# Patient Record
Sex: MALE | Race: WHITE | NOT HISPANIC OR LATINO | Employment: OTHER
[De-identification: names, ages, dates, MRNs, and addresses within clinical notes are randomized per-mention and may not be internally consistent; named-entity substitution may affect disease eponyms.]

---

## 2019-05-06 ENCOUNTER — RECORDS - HEALTHEAST (OUTPATIENT)
Dept: ADMINISTRATIVE | Facility: OTHER | Age: 64
End: 2019-05-06

## 2019-05-09 ENCOUNTER — AMBULATORY - HEALTHEAST (OUTPATIENT)
Dept: NEUROLOGY | Facility: CLINIC | Age: 64
End: 2019-05-09

## 2019-05-09 DIAGNOSIS — R41.3 MEMORY DIFFICULTIES: ICD-10-CM

## 2019-05-31 ENCOUNTER — COMMUNICATION - HEALTHEAST (OUTPATIENT)
Dept: BEHAVIORAL HEALTH | Facility: CLINIC | Age: 64
End: 2019-05-31

## 2019-06-06 ENCOUNTER — COMMUNICATION - HEALTHEAST (OUTPATIENT)
Dept: TELEHEALTH | Facility: CLINIC | Age: 64
End: 2019-06-06

## 2019-06-06 ENCOUNTER — HOSPITAL ENCOUNTER (OUTPATIENT)
Dept: MRI IMAGING | Facility: HOSPITAL | Age: 64
Discharge: HOME | End: 2019-06-06

## 2019-06-06 DIAGNOSIS — R41.3 MEMORY DIFFICULTIES: ICD-10-CM

## 2019-06-06 LAB
CREAT BLD-MCNC: 0.9 MG/DL (ref 0.7–1.3)
GFR SERPL CREATININE-BSD FRML MDRD: >60 ML/MIN/1.73M2

## 2021-06-16 PROBLEM — C43.62 MALIGNANT MELANOMA OF LEFT UPPER EXTREMITY INCLUDING SHOULDER (H): Status: ACTIVE | Noted: 2020-06-17

## 2021-06-16 PROBLEM — E66.811 CLASS 1 OBESITY WITH BODY MASS INDEX (BMI) OF 32.0 TO 32.9 IN ADULT: Status: ACTIVE | Noted: 2018-08-06

## 2021-06-16 PROBLEM — G47.33 OSA (OBSTRUCTIVE SLEEP APNEA): Status: ACTIVE | Noted: 2019-02-25

## 2021-06-16 PROBLEM — R55 SYNCOPE: Status: ACTIVE | Noted: 2019-03-05

## 2021-06-16 PROBLEM — Z85.850 HISTORY OF THYROID CANCER: Status: ACTIVE | Noted: 2018-08-06

## 2021-06-16 PROBLEM — E03.9 HYPOTHYROIDISM (ACQUIRED): Status: ACTIVE | Noted: 2018-02-12

## 2021-06-16 PROBLEM — I95.9 HYPOTENSION: Status: ACTIVE | Noted: 2020-11-25

## 2021-06-16 PROBLEM — U07.1 COVID-19: Status: ACTIVE | Noted: 2020-11-25

## 2021-06-16 PROBLEM — I10 BENIGN ESSENTIAL HTN: Status: ACTIVE | Noted: 2019-12-23

## 2021-06-19 NOTE — LETTER
Letter by Magdy Brian, Ph.D,LP at      Author: Magdy Brian, Ph.D,LP Service: -- Author Type: --    Filed:  Encounter Date: 5/31/2019 Status: (Other)         Mak Pollard  [address] [address]                 May 31, 2019       Dear Mr. Pollard,    Our office received a referral from Dr Cj Lowry @ [facility] to set up  neuropsychological testing for you. Staff have tried and left 3 voicemails at the number listed on the referral. If you are interested in scheduling this appointment, please call Claudia @ [phone] or leave a voice mail @ [phone]. This referral will be good for 1 year. Thanks!      Please call with questions or contact us using [employer].      Sincerely,        Electronically signed by Magdy Brian, Ph.D,LP

## 2021-06-27 ENCOUNTER — HEALTH MAINTENANCE LETTER (OUTPATIENT)
Age: 66
End: 2021-06-27

## 2021-10-17 ENCOUNTER — HEALTH MAINTENANCE LETTER (OUTPATIENT)
Age: 66
End: 2021-10-17

## 2021-11-17 ENCOUNTER — OFFICE VISIT (OUTPATIENT)
Dept: CARDIOLOGY | Facility: CLINIC | Age: 66
End: 2021-11-17
Payer: MEDICARE

## 2021-11-17 VITALS
BODY MASS INDEX: 35.7 KG/M2 | SYSTOLIC BLOOD PRESSURE: 124 MMHG | HEART RATE: 74 BPM | RESPIRATION RATE: 18 BRPM | WEIGHT: 255 LBS | DIASTOLIC BLOOD PRESSURE: 82 MMHG | HEIGHT: 71 IN

## 2021-11-17 DIAGNOSIS — R60.0 BILATERAL LOWER EXTREMITY EDEMA: Primary | ICD-10-CM

## 2021-11-17 PROCEDURE — 99204 OFFICE O/P NEW MOD 45 MIN: CPT | Performed by: INTERNAL MEDICINE

## 2021-11-17 RX ORDER — ALBUTEROL SULFATE 90 UG/1
1-2 AEROSOL, METERED RESPIRATORY (INHALATION) PRN
COMMUNITY
Start: 2021-03-25

## 2021-11-17 RX ORDER — BUDESONIDE AND FORMOTEROL FUMARATE DIHYDRATE 160; 4.5 UG/1; UG/1
1 AEROSOL RESPIRATORY (INHALATION) 2 TIMES DAILY
COMMUNITY
Start: 2021-03-25 | End: 2024-07-31

## 2021-11-17 RX ORDER — BUSPIRONE HYDROCHLORIDE 10 MG/1
10 TABLET ORAL PRN
COMMUNITY
Start: 2020-06-17 | End: 2023-07-11

## 2021-11-17 ASSESSMENT — MIFFLIN-ST. JEOR: SCORE: 1963.8

## 2021-11-17 NOTE — LETTER
11/17/2021    Joanne Winkler MD  [facility] [address] [address]    RE: Mak RACHEL Latrice       Dear Colleague,    I had the pleasure of seeing Mak Pollard in the [facility].           [facility]   [address], [place] [address]   [email or web address]   OFFICE: [phone]          Thank you Dr. Rodríguez for asking the [facility] team to participate in the care of your patient, Mak Pollard.     Impression and Plan     Lower extremity edema.  I suspect that this is primarily dependent in nature and also related to some central obesity limiting venous return.  As noted below, with the Covid pandemic he has gained approximately 20 pounds in weight and also has not been walking as much as he used to.  Suspect these are contributors.  He denies any other significant concerning symptoms.  His lower extremity edema is somewhat disparate and that his left ankle seems somewhat more swollen than the right.  He reports no injury or pain.  I discussed the suspected etiology with Mak and options for treatment.  Parenthetically, echocardiogram 31 March 2021 revealed no significant concerning findings.  Plan:     Bilateral lower extremity venous ultrasound to exclude evidence of venous thrombosis though not strongly suspected.    Patient instructed to adhere to low-sodium diet which he admits he has not been watching really at all.    Did discuss possibly starting diuretic to help with the swelling though he would rather work on trying to lose weight, be more active, and work hard on limiting his sodium.    Follow-up and further recommendations as needed pending lower extremity ultrasound.     30 minutes spent reviewing prior records (including documentation, laboratory studies, cardiac testing/imaging), interview with patient along with physical exam, planning, and  "subsequent documentation/crafting of note.       History of Present Illness    Once again I would like to thank you again for asking me to participate in the care of your patient, Mak Pollard.  As you know, but to reiterate for my own records, Mak Pollard is a 65 year old male who has been experiencing some bilateral lower extremity edema.  Patient reports over the past few months some gradual increasing lower extremity edema which has been mostly at the ankles with the left being slightly more than the right.  He denies any injury or pain involving the remedies.  He does admit that he has gained approximately 20 pounds over the summer.  He also has been less active in his activity/walking partly because of the pandemic.  He denies any chest pain.  His breathing is comfortable.  He reports no palpitations or lightheadedness.    Further review of systems is otherwise negative/noncontributory (medical record and 13 point review of systems reviewed as well and pertinent positives noted).       Cardiac Diagnostics       Echocardiogram 31 March 2021:   1. Technically difficult study.   2. Normal left ventricular size and systolic performance with ejection fraction of 65 to 70%.   3. Mild increase in left ventricular wall thickness.   4. No significant valvular heart disease.   5. Normal right ventricular size and systolic performance.   6. Mild to moderate left atrial enlargement.  Right term of normal dimension.      Holter monitor 5 March 2019:   1. Findings are consistent with completely benign 24-hour Holter monitor with no tachycardia or bradycardic arrhythmias or anything from an electrical aspect to explain patient's syncope.      Twelve-lead ECG (personally reviewed) 25 November 2020: Normal sinus rhythm.  Normal ECG.         Physical Examination       /82 (BP Location: Right arm, Patient Position: Sitting, Cuff Size: Adult Regular)   Pulse 74   Resp 18   Ht 1.803 m (5' 11\")   Wt 115.7 kg (255 " lb)   BMI 35.57 kg/m          Wt Readings from Last 3 Encounters:   11/17/21 115.7 kg (255 lb)   03/19/15 108.9 kg (240 lb)     The patient is alert and oriented times three. Sclerae are anicteric. Mucosal membranes are moist. Jugular venous pressure is normal. No significant adenopathy/thyromegally appreciated. Lungs are clear with good expansion. On cardiovascular exam, the patient has a regular S1 and S2. Abdomen is soft and non-tender. Extremities reveal no clubbing, cyanosis, or edema.       Imaging     Abdominal ultrasound 31 March 2021:   1. No abdominal aortic aneurysm.          Family History/Social History/Risk Factors   Patient does not smoke.  She denies any significant cardiac history in immediate family.       Medical History  Surgical History Family History Social History   No past medical history on file.  No past surgical history on file.  No family history on file.     Social History     Socioeconomic History     Marital status:      Spouse name: Not on file     Number of children: Not on file     Years of education: Not on file     Highest education level: Not on file   Occupational History     Not on file   Tobacco Use     Smoking status: Never Smoker     Smokeless tobacco: Never Used   Substance and Sexual Activity     Alcohol use: Yes     Drug use: Never     Sexual activity: Not on file   Other Topics Concern     Not on file   Social History Narrative     Not on file     Social Determinants of Health     Financial Resource Strain: Not on file   Food Insecurity: Not on file   Transportation Needs: Not on file   Physical Activity: Not on file   Stress: Not on file   Social Connections: Not on file   Intimate Partner Violence: Not on file   Housing Stability: Not on file           Medications  Allergies   Current Outpatient Medications   Medication Sig Dispense Refill     acetaminophen (TYLENOL) 325 MG tablet [ACETAMINOPHEN (TYLENOL) 325 MG TABLET] Take 650 mg by mouth every 4 (four) hours  as needed for pain or fever.       albuterol (PROAIR HFA/PROVENTIL HFA/VENTOLIN HFA) 108 (90 Base) MCG/ACT inhaler Inhale 1-2 puffs into the lungs as needed       budesonide-formoterol (SYMBICORT) 160-4.5 MCG/ACT Inhaler Inhale 1 puff into the lungs 2 times daily       busPIRone (BUSPAR) 10 MG tablet Take 10 mg by mouth as needed       candesartan (ATACAND) 32 MG tablet [CANDESARTAN (ATACAND) 32 MG TABLET] Take 32 mg by mouth daily.       citalopram (CELEXA) 20 MG tablet [CITALOPRAM (CELEXA) 20 MG TABLET] Take 20 mg by mouth daily.       fluticasone propionate (FLONASE) 50 mcg/actuation nasal spray [FLUTICASONE PROPIONATE (FLONASE) 50 MCG/ACTUATION NASAL SPRAY] Apply 1 spray into each nostril daily as needed for rhinitis.       ibuprofen (ADVIL,MOTRIN) 200 MG tablet [IBUPROFEN (ADVIL,MOTRIN) 200 MG TABLET] Take 400 mg by mouth every 4 (four) hours as needed for pain or fever.       levothyroxine (SYNTHROID, LEVOTHROID) 150 MCG tablet [LEVOTHYROXINE (SYNTHROID, LEVOTHROID) 150 MCG TABLET] Take 150 mcg by mouth Daily at 6:00 am.        triamcinolone (KENALOG) 0.1 % cream [TRIAMCINOLONE (KENALOG) 0.1 % CREAM] Apply 1 application topically 2 (two) times a day as needed.       rivaroxaban ANTICOAGULANT (XARELTO) 10 mg tablet [RIVAROXABAN ANTICOAGULANT (XARELTO) 10 MG TABLET] Take 1 tablet (10 mg total) by mouth daily. 30 tablet 0       Allergies   Allergen Reactions     Penicillins Anaphylaxis     Other Environmental Allergy Unknown          Lab Results    Chemistry/lipid CBC Cardiac Enzymes/BNP/TSH/INR   No results for input(s): CHOL, HDL, LDL, TRIG, CHOLHDLRATIO in the last 21956 hours.  No results for input(s): LDL in the last 53002 hours.  Recent Labs   Lab Test 11/26/20  0453      POTASSIUM 4.0   CHLORIDE 101   CO2 26   GLC 95   BUN 14   CR 0.93   GFRESTIMATED >60   MARILUZ 7.8*     Recent Labs   Lab Test 11/26/20  0453 11/25/20  1154 06/06/19  0832   CR 0.93 1.36* 0.9     No results for input(s): A1C in the last  43231 hours.       Recent Labs   Lab Test 11/26/20  0454   WBC 3.5*   HGB 13.4*   HCT 40.6   MCV 93   *     Recent Labs   Lab Test 11/26/20  0454 11/25/20  1154   HGB 13.4* 14.9    Recent Labs   Lab Test 11/26/20  0453 11/25/20  1154   TROPONINI 0.02 0.01     No results for input(s): BNP, NTBNPI, NTBNP in the last 59128 hours.  No results for input(s): TSH in the last 99213 hours.  Recent Labs   Lab Test 11/25/20  1718   INR 1.02          Medications  Allergies   Current Outpatient Medications   Medication Sig Dispense Refill     acetaminophen (TYLENOL) 325 MG tablet [ACETAMINOPHEN (TYLENOL) 325 MG TABLET] Take 650 mg by mouth every 4 (four) hours as needed for pain or fever.       albuterol (PROAIR HFA/PROVENTIL HFA/VENTOLIN HFA) 108 (90 Base) MCG/ACT inhaler Inhale 1-2 puffs into the lungs as needed       budesonide-formoterol (SYMBICORT) 160-4.5 MCG/ACT Inhaler Inhale 1 puff into the lungs 2 times daily       busPIRone (BUSPAR) 10 MG tablet Take 10 mg by mouth as needed       candesartan (ATACAND) 32 MG tablet [CANDESARTAN (ATACAND) 32 MG TABLET] Take 32 mg by mouth daily.       citalopram (CELEXA) 20 MG tablet [CITALOPRAM (CELEXA) 20 MG TABLET] Take 20 mg by mouth daily.       fluticasone propionate (FLONASE) 50 mcg/actuation nasal spray [FLUTICASONE PROPIONATE (FLONASE) 50 MCG/ACTUATION NASAL SPRAY] Apply 1 spray into each nostril daily as needed for rhinitis.       ibuprofen (ADVIL,MOTRIN) 200 MG tablet [IBUPROFEN (ADVIL,MOTRIN) 200 MG TABLET] Take 400 mg by mouth every 4 (four) hours as needed for pain or fever.       levothyroxine (SYNTHROID, LEVOTHROID) 150 MCG tablet [LEVOTHYROXINE (SYNTHROID, LEVOTHROID) 150 MCG TABLET] Take 150 mcg by mouth Daily at 6:00 am.        triamcinolone (KENALOG) 0.1 % cream [TRIAMCINOLONE (KENALOG) 0.1 % CREAM] Apply 1 application topically 2 (two) times a day as needed.       rivaroxaban ANTICOAGULANT (XARELTO) 10 mg tablet [RIVAROXABAN ANTICOAGULANT (XARELTO) 10 MG  TABLET] Take 1 tablet (10 mg total) by mouth daily. 30 tablet 0      Allergies   Allergen Reactions     Penicillins Anaphylaxis     Other Environmental Allergy Unknown        Lab Results   Lab Results   Component Value Date     11/26/2020    CO2 26 11/26/2020    BUN 14 11/26/2020     Lab Results   Component Value Date    WBC 3.5 11/26/2020    HGB 13.4 11/26/2020    HCT 40.6 11/26/2020    MCV 93 11/26/2020     11/26/2020     Lab Results   Component Value Date    INR 1.02 11/25/2020     Lab Results   Component Value Date    TROPONINI 0.02 11/26/2020    TROPONINI 0.01 11/25/2020                                 Thank you for allowing me to participate in the care of your patient.      Sincerely,     Cleopatra Freeman MD     [facility]  cc:   Referred Self  No address on file

## 2021-11-18 NOTE — PROGRESS NOTES
[facility] HE[facility] CARE   [address], [place] [address]   [email or web address]   OFFICE: [phone]          Thank you Dr. Rodríguez for asking the [facility] team to participate in the care of your patient, Mak Pollard.     Impression and Plan     Lower extremity edema.  I suspect that this is primarily dependent in nature and also related to some central obesity limiting venous return.  As noted below, with the Covid pandemic he has gained approximately 20 pounds in weight and also has not been walking as much as he used to.  Suspect these are contributors.  He denies any other significant concerning symptoms.  His lower extremity edema is somewhat disparate and that his left ankle seems somewhat more swollen than the right.  He reports no injury or pain.  I discussed the suspected etiology with Mak and options for treatment.  Parenthetically, echocardiogram 31 March 2021 revealed no significant concerning findings.  Plan:     Bilateral lower extremity venous ultrasound to exclude evidence of venous thrombosis though not strongly suspected.    Patient instructed to adhere to low-sodium diet which he admits he has not been watching really at all.    Did discuss possibly starting diuretic to help with the swelling though he would rather work on trying to lose weight, be more active, and work hard on limiting his sodium.    Follow-up and further recommendations as needed pending lower extremity ultrasound.     30 minutes spent reviewing prior records (including documentation, laboratory studies, cardiac testing/imaging), interview with patient along with physical exam, planning, and subsequent documentation/crafting of note.       History of Present Illness    Once again I would like to thank you again for asking me to participate in the care of your patient, Mak Pollard.  As you know, but to reiterate for my own records, Mak Pollard is a 65 year  "old male who has been experiencing some bilateral lower extremity edema.  Patient reports over the past few months some gradual increasing lower extremity edema which has been mostly at the ankles with the left being slightly more than the right.  He denies any injury or pain involving the remedies.  He does admit that he has gained approximately 20 pounds over the summer.  He also has been less active in his activity/walking partly because of the pandemic.  He denies any chest pain.  His breathing is comfortable.  He reports no palpitations or lightheadedness.    Further review of systems is otherwise negative/noncontributory (medical record and 13 point review of systems reviewed as well and pertinent positives noted).       Cardiac Diagnostics       Echocardiogram 31 March 2021:   1. Technically difficult study.   2. Normal left ventricular size and systolic performance with ejection fraction of 65 to 70%.   3. Mild increase in left ventricular wall thickness.   4. No significant valvular heart disease.   5. Normal right ventricular size and systolic performance.   6. Mild to moderate left atrial enlargement.  Right term of normal dimension.      Holter monitor 5 March 2019:   1. Findings are consistent with completely benign 24-hour Holter monitor with no tachycardia or bradycardic arrhythmias or anything from an electrical aspect to explain patient's syncope.      Twelve-lead ECG (personally reviewed) 25 November 2020: Normal sinus rhythm.  Normal ECG.         Physical Examination       /82 (BP Location: Right arm, Patient Position: Sitting, Cuff Size: Adult Regular)   Pulse 74   Resp 18   Ht 1.803 m (5' 11\")   Wt 115.7 kg (255 lb)   BMI 35.57 kg/m          Wt Readings from Last 3 Encounters:   11/17/21 115.7 kg (255 lb)   03/19/15 108.9 kg (240 lb)     The patient is alert and oriented times three. Sclerae are anicteric. Mucosal membranes are moist. Jugular venous pressure is normal. No significant " adenopathy/thyromegally appreciated. Lungs are clear with good expansion. On cardiovascular exam, the patient has a regular S1 and S2. Abdomen is soft and non-tender. Extremities reveal no clubbing, cyanosis, or edema.       Imaging     Abdominal ultrasound 31 March 2021:   1. No abdominal aortic aneurysm.          Family History/Social History/Risk Factors   Patient does not smoke.  She denies any significant cardiac history in immediate family.       Medical History  Surgical History Family History Social History   No past medical history on file.  No past surgical history on file.  No family history on file.     Social History     Socioeconomic History     Marital status:      Spouse name: Not on file     Number of children: Not on file     Years of education: Not on file     Highest education level: Not on file   Occupational History     Not on file   Tobacco Use     Smoking status: Never Smoker     Smokeless tobacco: Never Used   Substance and Sexual Activity     Alcohol use: Yes     Drug use: Never     Sexual activity: Not on file   Other Topics Concern     Not on file   Social History Narrative     Not on file     Social Determinants of Health     Financial Resource Strain: Not on file   Food Insecurity: Not on file   Transportation Needs: Not on file   Physical Activity: Not on file   Stress: Not on file   Social Connections: Not on file   Intimate Partner Violence: Not on file   Housing Stability: Not on file           Medications  Allergies   Current Outpatient Medications   Medication Sig Dispense Refill     acetaminophen (TYLENOL) 325 MG tablet [ACETAMINOPHEN (TYLENOL) 325 MG TABLET] Take 650 mg by mouth every 4 (four) hours as needed for pain or fever.       albuterol (PROAIR HFA/PROVENTIL HFA/VENTOLIN HFA) 108 (90 Base) MCG/ACT inhaler Inhale 1-2 puffs into the lungs as needed       budesonide-formoterol (SYMBICORT) 160-4.5 MCG/ACT Inhaler Inhale 1 puff into the lungs 2 times daily        busPIRone (BUSPAR) 10 MG tablet Take 10 mg by mouth as needed       candesartan (ATACAND) 32 MG tablet [CANDESARTAN (ATACAND) 32 MG TABLET] Take 32 mg by mouth daily.       citalopram (CELEXA) 20 MG tablet [CITALOPRAM (CELEXA) 20 MG TABLET] Take 20 mg by mouth daily.       fluticasone propionate (FLONASE) 50 mcg/actuation nasal spray [FLUTICASONE PROPIONATE (FLONASE) 50 MCG/ACTUATION NASAL SPRAY] Apply 1 spray into each nostril daily as needed for rhinitis.       ibuprofen (ADVIL,MOTRIN) 200 MG tablet [IBUPROFEN (ADVIL,MOTRIN) 200 MG TABLET] Take 400 mg by mouth every 4 (four) hours as needed for pain or fever.       levothyroxine (SYNTHROID, LEVOTHROID) 150 MCG tablet [LEVOTHYROXINE (SYNTHROID, LEVOTHROID) 150 MCG TABLET] Take 150 mcg by mouth Daily at 6:00 am.        triamcinolone (KENALOG) 0.1 % cream [TRIAMCINOLONE (KENALOG) 0.1 % CREAM] Apply 1 application topically 2 (two) times a day as needed.       rivaroxaban ANTICOAGULANT (XARELTO) 10 mg tablet [RIVAROXABAN ANTICOAGULANT (XARELTO) 10 MG TABLET] Take 1 tablet (10 mg total) by mouth daily. 30 tablet 0       Allergies   Allergen Reactions     Penicillins Anaphylaxis     Other Environmental Allergy Unknown          Lab Results    Chemistry/lipid CBC Cardiac Enzymes/BNP/TSH/INR   No results for input(s): CHOL, HDL, LDL, TRIG, CHOLHDLRATIO in the last 87486 hours.  No results for input(s): LDL in the last 00599 hours.  Recent Labs   Lab Test 11/26/20 0453      POTASSIUM 4.0   CHLORIDE 101   CO2 26   GLC 95   BUN 14   CR 0.93   GFRESTIMATED >60   MARILUZ 7.8*     Recent Labs   Lab Test 11/26/20 0453 11/25/20  1154 06/06/19  0832   CR 0.93 1.36* 0.9     No results for input(s): A1C in the last 55634 hours.       Recent Labs   Lab Test 11/26/20 0454   WBC 3.5*   HGB 13.4*   HCT 40.6   MCV 93   *     Recent Labs   Lab Test 11/26/20 0454 11/25/20  1154   HGB 13.4* 14.9    Recent Labs   Lab Test 11/26/20  0453 11/25/20  1154   TROPONINI 0.02 0.01     No  results for input(s): BNP, NTBNPI, NTBNP in the last 99544 hours.  No results for input(s): TSH in the last 82177 hours.  Recent Labs   Lab Test 11/25/20  1718   INR 1.02          Medications  Allergies   Current Outpatient Medications   Medication Sig Dispense Refill     acetaminophen (TYLENOL) 325 MG tablet [ACETAMINOPHEN (TYLENOL) 325 MG TABLET] Take 650 mg by mouth every 4 (four) hours as needed for pain or fever.       albuterol (PROAIR HFA/PROVENTIL HFA/VENTOLIN HFA) 108 (90 Base) MCG/ACT inhaler Inhale 1-2 puffs into the lungs as needed       budesonide-formoterol (SYMBICORT) 160-4.5 MCG/ACT Inhaler Inhale 1 puff into the lungs 2 times daily       busPIRone (BUSPAR) 10 MG tablet Take 10 mg by mouth as needed       candesartan (ATACAND) 32 MG tablet [CANDESARTAN (ATACAND) 32 MG TABLET] Take 32 mg by mouth daily.       citalopram (CELEXA) 20 MG tablet [CITALOPRAM (CELEXA) 20 MG TABLET] Take 20 mg by mouth daily.       fluticasone propionate (FLONASE) 50 mcg/actuation nasal spray [FLUTICASONE PROPIONATE (FLONASE) 50 MCG/ACTUATION NASAL SPRAY] Apply 1 spray into each nostril daily as needed for rhinitis.       ibuprofen (ADVIL,MOTRIN) 200 MG tablet [IBUPROFEN (ADVIL,MOTRIN) 200 MG TABLET] Take 400 mg by mouth every 4 (four) hours as needed for pain or fever.       levothyroxine (SYNTHROID, LEVOTHROID) 150 MCG tablet [LEVOTHYROXINE (SYNTHROID, LEVOTHROID) 150 MCG TABLET] Take 150 mcg by mouth Daily at 6:00 am.        triamcinolone (KENALOG) 0.1 % cream [TRIAMCINOLONE (KENALOG) 0.1 % CREAM] Apply 1 application topically 2 (two) times a day as needed.       rivaroxaban ANTICOAGULANT (XARELTO) 10 mg tablet [RIVAROXABAN ANTICOAGULANT (XARELTO) 10 MG TABLET] Take 1 tablet (10 mg total) by mouth daily. 30 tablet 0      Allergies   Allergen Reactions     Penicillins Anaphylaxis     Other Environmental Allergy Unknown        Lab Results   Lab Results   Component Value Date     11/26/2020    CO2 26 11/26/2020    BUN  14 11/26/2020     Lab Results   Component Value Date    WBC 3.5 11/26/2020    HGB 13.4 11/26/2020    HCT 40.6 11/26/2020    MCV 93 11/26/2020     11/26/2020     Lab Results   Component Value Date    INR 1.02 11/25/2020     Lab Results   Component Value Date    TROPONINI 0.02 11/26/2020    TROPONINI 0.01 11/25/2020

## 2022-07-24 ENCOUNTER — HEALTH MAINTENANCE LETTER (OUTPATIENT)
Age: 67
End: 2022-07-24

## 2022-10-02 ENCOUNTER — HEALTH MAINTENANCE LETTER (OUTPATIENT)
Age: 67
End: 2022-10-02

## 2023-01-03 DIAGNOSIS — J44.9 COPD (CHRONIC OBSTRUCTIVE PULMONARY DISEASE) (H): Primary | ICD-10-CM

## 2023-02-21 ENCOUNTER — ALLIED HEALTH/NURSE VISIT (OUTPATIENT)
Dept: PULMONOLOGY | Facility: CLINIC | Age: 68
End: 2023-02-21
Payer: MEDICARE

## 2023-02-21 DIAGNOSIS — J44.9 COPD (CHRONIC OBSTRUCTIVE PULMONARY DISEASE) (H): ICD-10-CM

## 2023-02-21 LAB — HGB BLD-MCNC: 16.4 G/DL

## 2023-02-21 PROCEDURE — 94060 EVALUATION OF WHEEZING: CPT | Performed by: INTERNAL MEDICINE

## 2023-02-21 PROCEDURE — 85018 HEMOGLOBIN: CPT

## 2023-02-21 PROCEDURE — 94729 DIFFUSING CAPACITY: CPT | Performed by: INTERNAL MEDICINE

## 2023-02-21 PROCEDURE — 94726 PLETHYSMOGRAPHY LUNG VOLUMES: CPT | Performed by: INTERNAL MEDICINE

## 2023-02-22 LAB
DLCOCOR-%PRED-PRE: 110 %
DLCOCOR-PRE: 29.76 ML/MIN/MMHG
DLCOUNC-%PRED-PRE: 116 %
DLCOUNC-PRE: 31.17 ML/MIN/MMHG
DLCOUNC-PRED: 26.83 ML/MIN/MMHG
ERV-%PRED-PRE: 29 %
ERV-PRE: 0.24 L
ERV-PRED: 0.81 L
EXPTIME-PRE: 7.63 SEC
FEF2575-%PRED-POST: 81 %
FEF2575-%PRED-PRE: 64 %
FEF2575-POST: 2.03 L/SEC
FEF2575-PRE: 1.62 L/SEC
FEF2575-PRED: 2.5 L/SEC
FEFMAX-%PRED-PRE: 74 %
FEFMAX-PRE: 6.49 L/SEC
FEFMAX-PRED: 8.76 L/SEC
FEV1-%PRED-PRE: 70 %
FEV1-PRE: 2.22 L
FEV1FEV6-PRE: 72 %
FEV1FEV6-PRED: 78 %
FEV1FVC-PRE: 72 %
FEV1FVC-PRED: 77 %
FEV1SVC-PRE: 63 %
FEV1SVC-PRED: 64 %
FIFMAX-PRE: 5.2 L/SEC
FRCPLETH-%PRED-PRE: 79 %
FRCPLETH-PRE: 2.94 L
FRCPLETH-PRED: 3.7 L
FVC-%PRED-PRE: 75 %
FVC-PRE: 3.08 L
FVC-PRED: 4.09 L
IC-%PRED-PRE: 78 %
IC-PRE: 3.21 L
IC-PRED: 4.11 L
RVPLETH-%PRED-PRE: 102 %
RVPLETH-PRE: 2.65 L
RVPLETH-PRED: 2.59 L
TLCPLETH-%PRED-PRE: 85 %
TLCPLETH-PRE: 6.15 L
TLCPLETH-PRED: 7.23 L
VA-%PRED-PRE: 84 %
VA-PRE: 5.52 L
VC-%PRED-PRE: 71 %
VC-PRE: 3.5 L
VC-PRED: 4.92 L

## 2023-02-23 ENCOUNTER — VIRTUAL VISIT (OUTPATIENT)
Dept: PULMONOLOGY | Facility: CLINIC | Age: 68
End: 2023-02-23
Payer: MEDICARE

## 2023-02-23 DIAGNOSIS — R06.09 DOE (DYSPNEA ON EXERTION): Primary | ICD-10-CM

## 2023-02-23 PROCEDURE — 99204 OFFICE O/P NEW MOD 45 MIN: CPT | Mod: VID | Performed by: INTERNAL MEDICINE

## 2023-02-23 NOTE — LETTER
2/23/2023         RE: Mak Pollard  [address] [address]        Dear Colleague,    Thank you for referring your patient, Mak Pollard, to the [facility]. Please see a copy of my visit note below.    Video-Visit Details    Type of service:  Video Visit    Video Start Time (time video started): 8:52am    Video End Time (time video stopped): 9:18am    Originating Location (pt. Location): Home        Distant Location (provider location):  Off-site    Mode of Communication:  Video Conference via [employer]      CC: COPD evaluation.    HPI: 67M with a history of former smoker, who's been having some chest pain for the last 2-3 weeks, who presents for breathing evaluation. He doesn't have any shortness of breath at rest. He used to have a lot of coughing and wheezing which was relieved by the resue inhaler.   He gets SOB with shoveling and climbing steps. No chest pain when he does the shoveling. He has some sinus issues.   He was previously diagnosed with COPD in 2019 and he is on Symbicort and a rescue inhaler.   Never smoked.  He is retired  for the [employer]. He is pretty sedentary.  He's gained 10-20 lbs since retiring.     He has been on Symbicort for 1-2 years. 1 puff bid.  Rare use of rescue inhaler. He used to use it a lot when he had a cough.    Family history reviewed and is unremarkable.    PMHX  No past medical history on file.     Medications:  Reviewed    Exam (virtual)  Looks well, breathing comfortably, no distress.  Speaking in full sentences.    Labs:  Reviewed.     Imaging:  CXR from 11/25/2020  IMPRESSION:  A few faint reticulonodular opacities in the lower lungs laterally could represent either minimal airspace opacity and/or exaggeration of normal lung markings due to the markedly shallow inspiration. Lungs otherwise clear. Heart size and   pulmonary vascularity within normal limits.    PFTs:  2/21/2023  FEV 2.2L, 70%  FVC  75%  Ratio 0.72  No BD response (10% improvement in FVC)  TLC 6.15L, 85%  DLco 116% china for hgb  Flow volume loop is normal.    PFTs from [facility], Jan 2021:  TLC 74% predicted  FEV1 54% with +BD response  Ratio 0.72  DLco normal.     Echo from [facility] March 2021  Final Conclusion Previous Study: 03/04/19    1. Technically challenging echocardiogram.    2. Normal left ventricular chamber size. Normal left ventricular systolic function. Calculated   left ventricular ejection fraction (modified    Marcelino technique) is 66 %. No regional wall motion abnormalities.  Mild concentric increase   in left ventricular wall thickness.    3. Indeterminate left ventricular diastolic function.    4. Normal right ventricular chamber size. Normal right ventricular systolic function.    5. Inferior vena cava was not well visualized.    6. No significant valvular heart disease.    7. When compared to the previous echocardiographic images of 03/04/19, there has been no   significant change.       Impression: 65M who was previously healthy, with a history of COVID-19 infection x3, presents for evaluation of COPD and dyspnea on exertion. We reviewed his PFTs and there is NO evidence of COPD based on the normal FEV1/FVC ratio. He does not have a history of smoking. He did have borderline bronchodilator response recently and a few years ago on PFTs from [facility].  His symptoms of episodic wheezing, cough, and shortness of breath are more consistent with asthma vs. Reactive airway disease.  He seems to be doing well on ICS/LABA and CHAMP but does have some dyspnea on exertion. This may be due to deconditioning and weight gain and we talked about this today.  I do not think his episodic chest pain/pressure is due to a lung process. I would be more worried about angina as he does have risk factors for CAD. He will be seeing cardiology shortly to discuss this.   I reassured Mak that his lung function is quite good and in fact looks improved when  compared to PFTs done 2 years ago.     Recommendations:  - continue Symbicort 160-4.5, 1 puff bid with spacer. Rinse/gargle/spit after use. Asked him to obtain spacer and go online for spacer teaching  - continue albuterol rescue inhaler as needed  - encouraged Mak to remain active and exercise  - pulmonary rehab referral placed today - discussed health/benefits  - follow up with cardiology on 2/28/2023 as scheduled  - UTD with covid-19 and pneumococcal vaccination. He didn't get the flu shot this year. I recommended flu shot annually every Fall.    Follow up in 3 months for reassessment.  All questions answered.         Again, thank you for allowing me to participate in the care of your patient.        Sincerely,        Gabriel Lassiter MD

## 2023-02-23 NOTE — PROGRESS NOTES
Video-Visit Details    Type of service:  Video Visit    Video Start Time (time video started): 8:52am    Video End Time (time video stopped): 9:18am    Originating Location (pt. Location): Home        Distant Location (provider location):  Off-site    Mode of Communication:  Video Conference via [employer]      CC: COPD evaluation.    HPI: 67M with a history of former smoker, who's been having some chest pain for the last 2-3 weeks, who presents for breathing evaluation. He doesn't have any shortness of breath at rest. He used to have a lot of coughing and wheezing which was relieved by the resue inhaler.   He gets SOB with shoveling and climbing steps. No chest pain when he does the shoveling. He has some sinus issues.   He was previously diagnosed with COPD in 2019 and he is on Symbicort and a rescue inhaler.   Never smoked.  He is retired  for the [employer], [place]. He is pretty sedentary.  He's gained 10-20 lbs since retiring.     He has been on Symbicort for 1-2 years. 1 puff bid.  Rare use of rescue inhaler. He used to use it a lot when he had a cough.    Family history reviewed and is unremarkable.    PMHX  No past medical history on file.     Medications:  Reviewed    Exam (virtual)  Looks well, breathing comfortably, no distress.  Speaking in full sentences.    Labs:  Reviewed.     Imaging:  CXR from 11/25/2020  IMPRESSION:  A few faint reticulonodular opacities in the lower lungs laterally could represent either minimal airspace opacity and/or exaggeration of normal lung markings due to the markedly shallow inspiration. Lungs otherwise clear. Heart size and   pulmonary vascularity within normal limits.    PFTs:  2/21/2023  FEV 2.2L, 70%  FVC 75%  Ratio 0.72  No BD response (10% improvement in FVC)  TLC 6.15L, 85%  DLco 116% china for hgb  Flow volume loop is normal.    PFTs from [facility], Jan 2021:  TLC 74% predicted  FEV1 54% with +BD response  Ratio 0.72  DLco normal.     Echo from [facility] March  2021  Final Conclusion Previous Study: 03/04/19    1. Technically challenging echocardiogram.    2. Normal left ventricular chamber size. Normal left ventricular systolic function. Calculated   left ventricular ejection fraction (modified    Marcelino technique) is 66 %. No regional wall motion abnormalities.  Mild concentric increase   in left ventricular wall thickness.    3. Indeterminate left ventricular diastolic function.    4. Normal right ventricular chamber size. Normal right ventricular systolic function.    5. Inferior vena cava was not well visualized.    6. No significant valvular heart disease.    7. When compared to the previous echocardiographic images of 03/04/19, there has been no   significant change.       Impression: 65M who was previously healthy, with a history of COVID-19 infection x3, presents for evaluation of COPD and dyspnea on exertion. We reviewed his PFTs and there is NO evidence of COPD based on the normal FEV1/FVC ratio. He does not have a history of smoking. He did have borderline bronchodilator response recently and a few years ago on PFTs from [facility].  His symptoms of episodic wheezing, cough, and shortness of breath are more consistent with asthma vs. Reactive airway disease.  He seems to be doing well on ICS/LABA and CHAMP but does have some dyspnea on exertion. This may be due to deconditioning and weight gain and we talked about this today.  I do not think his episodic chest pain/pressure is due to a lung process. I would be more worried about angina as he does have risk factors for CAD. He will be seeing cardiology shortly to discuss this.   I reassured Mak that his lung function is quite good and in fact looks improved when compared to PFTs done 2 years ago.     Recommendations:  - continue Symbicort 160-4.5, 1 puff bid with spacer. Rinse/gargle/spit after use. Asked him to obtain spacer and go online for spacer teaching  - continue albuterol rescue inhaler as needed  -  encouraged Mak to remain active and exercise  - pulmonary rehab referral placed today - discussed health/benefits  - follow up with cardiology on 2/28/2023 as scheduled  - UTD with covid-19 and pneumococcal vaccination. He didn't get the flu shot this year. I recommended flu shot annually every Fall.    Follow up in 3 months for reassessment.  All questions answered.

## 2023-02-23 NOTE — NURSING NOTE
Is the patient currently in the state of [place]? YES    Visit mode:VIDEO    If the visit is dropped, the patient can be reconnected by: VIDEO VISIT: Text to cell phone: [phone]    Will anyone else be joining the visit? NO      How would you like to obtain your AVS? MyChart    Are changes needed to the allergy or medication list? NO    Reason for visit: N/A    Alida Lind VF

## 2023-02-28 ENCOUNTER — OFFICE VISIT (OUTPATIENT)
Dept: CARDIOLOGY | Facility: CLINIC | Age: 68
End: 2023-02-28
Payer: MEDICARE

## 2023-02-28 VITALS
WEIGHT: 240.7 LBS | HEART RATE: 71 BPM | BODY MASS INDEX: 33.7 KG/M2 | HEIGHT: 71 IN | SYSTOLIC BLOOD PRESSURE: 128 MMHG | RESPIRATION RATE: 16 BRPM | DIASTOLIC BLOOD PRESSURE: 60 MMHG | OXYGEN SATURATION: 98 %

## 2023-02-28 DIAGNOSIS — R07.2 PRECORDIAL PAIN: Primary | ICD-10-CM

## 2023-02-28 DIAGNOSIS — R60.0 BILATERAL LOWER EXTREMITY EDEMA: ICD-10-CM

## 2023-02-28 PROCEDURE — 99214 OFFICE O/P EST MOD 30 MIN: CPT | Performed by: INTERNAL MEDICINE

## 2023-02-28 NOTE — PROGRESS NOTES
[facility]   [address], [place] [address]   [email or web address]   OFFICE: [phone]          Thank you Joanne Dominique for asking the [facility] team to participate in the care of your patient, Mak Pollard.     Impression and Plan     1.  Chest discomfort.  Certain features are somewhat atypical.  He has only had 2 episodes and both not exertionally related.  This is despite being quite active snow shoveling and the like.  He reports no significant associated symptoms such as inordinate shortness of breath.  His father has a history of coronary disease, but later in life having had bypass surgery in his 80s.  His lipids have historically have been favorable.  Blood pressure today is quite reasonable as well.  We did discuss possible stress testing which she has done in the past.  Today, we jointly decided to defer given the atypical nature of his symptoms.  Should he have recurrent chest discomfort more suggestive of angina we could certainly reconsider.  Mak is quite comfortable with this plan.    2.  Lower extremity edema.  This is essentially resolved.  He reports no significant recurrence over the last 4 months.    At this time, feel I can follow-up with Mak on an as-needed basis.  He is comfortable with this plan.      35 minutes spent reviewing prior records (including documentation, laboratory studies, cardiac testing/imaging), interview with patient along with physical exam, planning, and subsequent documentation/crafting of note).           History of Present Illness    Once again I would like to thank you again for asking me to participate in the care of your patient, Mak Pollard.  As you know, but to reiterate for my own records, Mak Pollard is a 67 year old male with that I had seen previously for lower extremity edema.  Mak on follow-up states that this has resolved.    More recently, Mak  "states that he has had a couple episodes of chest discomfort.  He describes somewhat of a right-sided chest pressure.  This has only occurred on 2 occasions.  He states it occurred at rest.  He reports no chest discomfort with exertion, however.  He states that with this no fall recently he did a fair amount of shoveling and he had no difficulty with this activity and specifically denied any chest pain or subjective decline in exercise tolerance.  He reports no chest discomfort with other activities.    Further review of systems is otherwise negative/noncontributory (medical record and 13 point review of systems reviewed as well and pertinent positives noted).         Cardiac Diagnostics      Echocardiogram 31 March 2021:   1. Technically difficult study.   2. Normal left ventricular size and systolic performance with ejection fraction of 65 to 70%.   3. Mild increase in left ventricular wall thickness.   4. No significant valvular heart disease.   5. Normal right ventricular size and systolic performance.   6. Mild to moderate left atrial enlargement.  Right term of normal dimension.     Holter monitor 5 March 2019:   1. Findings are consistent with completely benign 24-hour Holter monitor with no tachycardia or bradycardic arrhythmias or anything from an electrical aspect to explain patient's syncope.      Twelve-lead ECG (personally reviewed) 25 November 2020: Normal sinus rhythm.  Normal ECG.          Physical Examination       /60 (BP Location: Left arm, Patient Position: Sitting, Cuff Size: Adult Regular)   Pulse 71   Resp 16   Ht 1.791 m (5' 10.5\")   Wt 109.2 kg (240 lb 11.2 oz)   SpO2 98%   BMI 34.05 kg/m          Wt Readings from Last 3 Encounters:   02/28/23 109.2 kg (240 lb 11.2 oz)   11/17/21 115.7 kg (255 lb)   03/19/15 108.9 kg (240 lb)       The patient is alert and oriented times three. Sclerae are anicteric. Mucosal membranes are moist. Jugular venous pressure is normal. No significant " adenopathy/thyromegally appreciated. Lungs are clear with good expansion. On cardiovascular exam, the patient has a regular S1 and S2. Abdomen is soft and non-tender. Extremities reveal no clubbing, cyanosis, or edema.         Medications  Allergies   Current Outpatient Medications   Medication Sig Dispense Refill     acetaminophen (TYLENOL) 325 MG tablet [ACETAMINOPHEN (TYLENOL) 325 MG TABLET] Take 650 mg by mouth every 4 (four) hours as needed for pain or fever.       albuterol (PROAIR HFA/PROVENTIL HFA/VENTOLIN HFA) 108 (90 Base) MCG/ACT inhaler Inhale 1-2 puffs into the lungs as needed       budesonide-formoterol (SYMBICORT) 160-4.5 MCG/ACT Inhaler Inhale 1 puff into the lungs 2 times daily       busPIRone (BUSPAR) 10 MG tablet Take 10 mg by mouth as needed       candesartan (ATACAND) 32 MG tablet [CANDESARTAN (ATACAND) 32 MG TABLET] Take 32 mg by mouth daily.       citalopram (CELEXA) 20 MG tablet [CITALOPRAM (CELEXA) 20 MG TABLET] Take 20 mg by mouth daily.       fluticasone propionate (FLONASE) 50 mcg/actuation nasal spray [FLUTICASONE PROPIONATE (FLONASE) 50 MCG/ACTUATION NASAL SPRAY] Apply 1 spray into each nostril daily as needed for rhinitis.       ibuprofen (ADVIL,MOTRIN) 200 MG tablet [IBUPROFEN (ADVIL,MOTRIN) 200 MG TABLET] Take 400 mg by mouth every 4 (four) hours as needed for pain or fever.       levothyroxine (SYNTHROID, LEVOTHROID) 150 MCG tablet [LEVOTHYROXINE (SYNTHROID, LEVOTHROID) 150 MCG TABLET] Take 150 mcg by mouth Daily at 6:00 am.        triamcinolone (KENALOG) 0.1 % cream [TRIAMCINOLONE (KENALOG) 0.1 % CREAM] Apply 1 application topically 2 (two) times a day as needed.       rivaroxaban ANTICOAGULANT (XARELTO) 10 mg tablet [RIVAROXABAN ANTICOAGULANT (XARELTO) 10 MG TABLET] Take 1 tablet (10 mg total) by mouth daily. 30 tablet 0       Allergies   Allergen Reactions     Penicillins Anaphylaxis     Other Environmental Allergy Unknown          Lab Results    Chemistry/lipid CBC Cardiac  Enzymes/BNP/TSH/INR   No results for input(s): CHOL, HDL, LDL, TRIG, CHOLHDLRATIO in the last 23531 hours.  No results for input(s): LDL in the last 89261 hours.  Recent Labs   Lab Test 11/26/20  0453      POTASSIUM 4.0   CHLORIDE 101   CO2 26   GLC 95   BUN 14   CR 0.93   GFRESTIMATED >60   MARILUZ 7.8*     Recent Labs   Lab Test 11/26/20  0453 11/25/20  1154 06/06/19  0832   CR 0.93 1.36* 0.9     No results for input(s): A1C in the last 11787 hours.       Recent Labs   Lab Test 02/21/23  1436 11/26/20  0454   WBC  --  3.5*   HGB 16.4 13.4*   HCT  --  40.6   MCV  --  93   PLT  --  120*     Recent Labs   Lab Test 02/21/23  1436 11/26/20  0454 11/25/20  1154   HGB 16.4 13.4* 14.9    Recent Labs   Lab Test 11/26/20  0453 11/25/20  1154   TROPONINI 0.02 0.01     No results for input(s): BNP, NTBNPI, NTBNP in the last 00589 hours.  No results for input(s): TSH in the last 17422 hours.  Recent Labs   Lab Test 11/25/20  1718   INR 1.02        Medical History  Surgical History Family History Social History   No past medical history on file.  No past surgical history on file.  No family history on file.     Social History     Socioeconomic History     Marital status:      Spouse name: Not on file     Number of children: Not on file     Years of education: Not on file     Highest education level: Not on file   Occupational History     Not on file   Tobacco Use     Smoking status: Never     Smokeless tobacco: Never   Substance and Sexual Activity     Alcohol use: Yes     Drug use: Never     Sexual activity: Not on file   Other Topics Concern     Not on file   Social History Narrative     Not on file     Social Determinants of Health     Financial Resource Strain: Not on file   Food Insecurity: Not on file   Transportation Needs: Not on file   Physical Activity: Not on file   Stress: Not on file   Social Connections: Not on file   Intimate Partner Violence: Not on file   Housing Stability: Not on file

## 2023-02-28 NOTE — LETTER
2/28/2023    Joanne Winkler MD  [facility] [address] [facility] [address]    RE: Mak RACHEL Latrice       Dear Colleague,     I had the pleasure of seeing Mak Pollard in the [facility].         [facility]   [address], [place] [address]   [email or web address]   OFFICE: [phone]          Thank you Dr. Winkler, Joanne Smith for asking the [facility] team to participate in the care of your patient, Mak Pollard.     Impression and Plan     1.  Chest discomfort.  Certain features are somewhat atypical.  He has only had 2 episodes and both not exertionally related.  This is despite being quite active snow shoveling and the like.  He reports no significant associated symptoms such as inordinate shortness of breath.  His father has a history of coronary disease, but later in life having had bypass surgery in his 80s.  His lipids have historically have been favorable.  Blood pressure today is quite reasonable as well.  We did discuss possible stress testing which she has done in the past.  Today, we jointly decided to defer given the atypical nature of his symptoms.  Should he have recurrent chest discomfort more suggestive of angina we could certainly reconsider.  Mak is quite comfortable with this plan.    2.  Lower extremity edema.  This is essentially resolved.  He reports no significant recurrence over the last 4 months.    At this time, feel I can follow-up with Mak on an as-needed basis.  He is comfortable with this plan.      35 minutes spent reviewing prior records (including documentation, laboratory studies, cardiac testing/imaging), interview with patient along with physical exam, planning, and subsequent documentation/crafting of note).           History of Present Illness    Once again I would like to thank you again for asking me to participate in the care of your patient, Mak RACHEL  "Latrice.  As you know, but to reiterate for my own records, Mak Pollard is a 67 year old male with that I had seen previously for lower extremity edema.  Mak on follow-up states that this has resolved.    More recently, Mak states that he has had a couple episodes of chest discomfort.  He describes somewhat of a right-sided chest pressure.  This has only occurred on 2 occasions.  He states it occurred at rest.  He reports no chest discomfort with exertion, however.  He states that with this no fall recently he did a fair amount of shoveling and he had no difficulty with this activity and specifically denied any chest pain or subjective decline in exercise tolerance.  He reports no chest discomfort with other activities.    Further review of systems is otherwise negative/noncontributory (medical record and 13 point review of systems reviewed as well and pertinent positives noted).         Cardiac Diagnostics      Echocardiogram 31 March 2021:   1. Technically difficult study.   2. Normal left ventricular size and systolic performance with ejection fraction of 65 to 70%.   3. Mild increase in left ventricular wall thickness.   4. No significant valvular heart disease.   5. Normal right ventricular size and systolic performance.   6. Mild to moderate left atrial enlargement.  Right term of normal dimension.     Holter monitor 5 March 2019:   1. Findings are consistent with completely benign 24-hour Holter monitor with no tachycardia or bradycardic arrhythmias or anything from an electrical aspect to explain patient's syncope.      Twelve-lead ECG (personally reviewed) 25 November 2020: Normal sinus rhythm.  Normal ECG.          Physical Examination       /60 (BP Location: Left arm, Patient Position: Sitting, Cuff Size: Adult Regular)   Pulse 71   Resp 16   Ht 1.791 m (5' 10.5\")   Wt 109.2 kg (240 lb 11.2 oz)   SpO2 98%   BMI 34.05 kg/m          Wt Readings from Last 3 Encounters:   02/28/23 109.2 " kg (240 lb 11.2 oz)   11/17/21 115.7 kg (255 lb)   03/19/15 108.9 kg (240 lb)       The patient is alert and oriented times three. Sclerae are anicteric. Mucosal membranes are moist. Jugular venous pressure is normal. No significant adenopathy/thyromegally appreciated. Lungs are clear with good expansion. On cardiovascular exam, the patient has a regular S1 and S2. Abdomen is soft and non-tender. Extremities reveal no clubbing, cyanosis, or edema.         Medications  Allergies   Current Outpatient Medications   Medication Sig Dispense Refill     acetaminophen (TYLENOL) 325 MG tablet [ACETAMINOPHEN (TYLENOL) 325 MG TABLET] Take 650 mg by mouth every 4 (four) hours as needed for pain or fever.       albuterol (PROAIR HFA/PROVENTIL HFA/VENTOLIN HFA) 108 (90 Base) MCG/ACT inhaler Inhale 1-2 puffs into the lungs as needed       budesonide-formoterol (SYMBICORT) 160-4.5 MCG/ACT Inhaler Inhale 1 puff into the lungs 2 times daily       busPIRone (BUSPAR) 10 MG tablet Take 10 mg by mouth as needed       candesartan (ATACAND) 32 MG tablet [CANDESARTAN (ATACAND) 32 MG TABLET] Take 32 mg by mouth daily.       citalopram (CELEXA) 20 MG tablet [CITALOPRAM (CELEXA) 20 MG TABLET] Take 20 mg by mouth daily.       fluticasone propionate (FLONASE) 50 mcg/actuation nasal spray [FLUTICASONE PROPIONATE (FLONASE) 50 MCG/ACTUATION NASAL SPRAY] Apply 1 spray into each nostril daily as needed for rhinitis.       ibuprofen (ADVIL,MOTRIN) 200 MG tablet [IBUPROFEN (ADVIL,MOTRIN) 200 MG TABLET] Take 400 mg by mouth every 4 (four) hours as needed for pain or fever.       levothyroxine (SYNTHROID, LEVOTHROID) 150 MCG tablet [LEVOTHYROXINE (SYNTHROID, LEVOTHROID) 150 MCG TABLET] Take 150 mcg by mouth Daily at 6:00 am.        triamcinolone (KENALOG) 0.1 % cream [TRIAMCINOLONE (KENALOG) 0.1 % CREAM] Apply 1 application topically 2 (two) times a day as needed.       rivaroxaban ANTICOAGULANT (XARELTO) 10 mg tablet [RIVAROXABAN ANTICOAGULANT (XARELTO)  10 MG TABLET] Take 1 tablet (10 mg total) by mouth daily. 30 tablet 0       Allergies   Allergen Reactions     Penicillins Anaphylaxis     Other Environmental Allergy Unknown          Lab Results    Chemistry/lipid CBC Cardiac Enzymes/BNP/TSH/INR   No results for input(s): CHOL, HDL, LDL, TRIG, CHOLHDLRATIO in the last 03188 hours.  No results for input(s): LDL in the last 48152 hours.  Recent Labs   Lab Test 11/26/20  0453      POTASSIUM 4.0   CHLORIDE 101   CO2 26   GLC 95   BUN 14   CR 0.93   GFRESTIMATED >60   MARILUZ 7.8*     Recent Labs   Lab Test 11/26/20  0453 11/25/20  1154 06/06/19  0832   CR 0.93 1.36* 0.9     No results for input(s): A1C in the last 17223 hours.       Recent Labs   Lab Test 02/21/23  1436 11/26/20  0454   WBC  --  3.5*   HGB 16.4 13.4*   HCT  --  40.6   MCV  --  93   PLT  --  120*     Recent Labs   Lab Test 02/21/23  1436 11/26/20  0454 11/25/20  1154   HGB 16.4 13.4* 14.9    Recent Labs   Lab Test 11/26/20  0453 11/25/20  1154   TROPONINI 0.02 0.01     No results for input(s): BNP, NTBNPI, NTBNP in the last 30934 hours.  No results for input(s): TSH in the last 66546 hours.  Recent Labs   Lab Test 11/25/20  1718   INR 1.02        Medical History  Surgical History Family History Social History   No past medical history on file.  No past surgical history on file.  No family history on file.     Social History     Socioeconomic History     Marital status:      Spouse name: Not on file     Number of children: Not on file     Years of education: Not on file     Highest education level: Not on file   Occupational History     Not on file   Tobacco Use     Smoking status: Never     Smokeless tobacco: Never   Substance and Sexual Activity     Alcohol use: Yes     Drug use: Never     Sexual activity: Not on file   Other Topics Concern     Not on file   Social History Narrative     Not on file     Social Determinants of Health     Financial Resource Strain: Not on file   Food Insecurity:  Not on file   Transportation Needs: Not on file   Physical Activity: Not on file   Stress: Not on file   Social Connections: Not on file   Intimate Partner Violence: Not on file   Housing Stability: Not on file                      Thank you for allowing me to participate in the care of your patient.      Sincerely,     Cleopatra Freeman MD     [facility]  cc:   No referring provider defined for this encounter.

## 2023-04-10 ENCOUNTER — HOSPITAL ENCOUNTER (OUTPATIENT)
Dept: CARDIAC REHAB | Facility: HOSPITAL | Age: 68
Discharge: HOME | End: 2023-04-10
Attending: INTERNAL MEDICINE
Payer: MEDICARE

## 2023-04-10 DIAGNOSIS — R06.09 DOE (DYSPNEA ON EXERTION): ICD-10-CM

## 2023-04-10 PROCEDURE — G0238 OTH RESP PROC, INDIV: HCPCS

## 2023-04-12 ENCOUNTER — HOSPITAL ENCOUNTER (OUTPATIENT)
Dept: CARDIAC REHAB | Facility: HOSPITAL | Age: 68
Discharge: HOME | End: 2023-04-12
Attending: INTERNAL MEDICINE
Payer: MEDICARE

## 2023-04-12 PROCEDURE — G0238 OTH RESP PROC, INDIV: HCPCS

## 2023-04-18 ENCOUNTER — HOSPITAL ENCOUNTER (OUTPATIENT)
Dept: CARDIAC REHAB | Facility: HOSPITAL | Age: 68
Discharge: HOME | End: 2023-04-18
Attending: INTERNAL MEDICINE
Payer: MEDICARE

## 2023-04-18 PROCEDURE — G0239 OTH RESP PROC, GROUP: HCPCS

## 2023-04-20 ENCOUNTER — HOSPITAL ENCOUNTER (OUTPATIENT)
Dept: CARDIAC REHAB | Facility: HOSPITAL | Age: 68
Discharge: HOME | End: 2023-04-20
Attending: INTERNAL MEDICINE
Payer: MEDICARE

## 2023-04-20 PROCEDURE — G0239 OTH RESP PROC, GROUP: HCPCS

## 2023-04-25 ENCOUNTER — HOSPITAL ENCOUNTER (OUTPATIENT)
Dept: CARDIAC REHAB | Facility: HOSPITAL | Age: 68
Discharge: HOME | End: 2023-04-25
Attending: INTERNAL MEDICINE
Payer: MEDICARE

## 2023-04-25 PROCEDURE — G0239 OTH RESP PROC, GROUP: HCPCS

## 2023-04-27 ENCOUNTER — HOSPITAL ENCOUNTER (OUTPATIENT)
Dept: CARDIAC REHAB | Facility: HOSPITAL | Age: 68
Discharge: HOME | End: 2023-04-27
Attending: INTERNAL MEDICINE
Payer: MEDICARE

## 2023-04-27 PROCEDURE — G0239 OTH RESP PROC, GROUP: HCPCS

## 2023-05-02 ENCOUNTER — HOSPITAL ENCOUNTER (OUTPATIENT)
Dept: CARDIAC REHAB | Facility: HOSPITAL | Age: 68
Discharge: HOME | End: 2023-05-02
Attending: INTERNAL MEDICINE
Payer: MEDICARE

## 2023-05-02 PROCEDURE — G0239 OTH RESP PROC, GROUP: HCPCS

## 2023-05-04 ENCOUNTER — HOSPITAL ENCOUNTER (OUTPATIENT)
Dept: CARDIAC REHAB | Facility: HOSPITAL | Age: 68
Discharge: HOME | End: 2023-05-04
Attending: INTERNAL MEDICINE
Payer: MEDICARE

## 2023-05-04 PROCEDURE — G0239 OTH RESP PROC, GROUP: HCPCS

## 2023-05-09 ENCOUNTER — HOSPITAL ENCOUNTER (OUTPATIENT)
Dept: CARDIAC REHAB | Facility: HOSPITAL | Age: 68
Discharge: HOME | End: 2023-05-09
Attending: INTERNAL MEDICINE
Payer: MEDICARE

## 2023-05-09 PROCEDURE — G0239 OTH RESP PROC, GROUP: HCPCS

## 2023-05-13 ENCOUNTER — TELEPHONE (OUTPATIENT)
Dept: OPHTHALMOLOGY | Facility: CLINIC | Age: 68
End: 2023-05-13
Payer: MEDICARE

## 2023-05-13 NOTE — TELEPHONE ENCOUNTER
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 Patient did home nasal irrigation and had right sided swelling and lacrimal drainage after irrigation.   CT shows some right intra-orbital air    Dr Rose wanted to discuss the case  Patient says right eye is slightly blurry but otherwise vision is intact. There was no diplopia and she said he moved his eye freely. The lids were soft. She said he does not have proptosis and the CT does not show proptosis either just some intraorbital air.     The [facility] has no tonopen so they could not check a pressure.     I told her I would be happy to see the patient if she is concerned or unsure. Based on our discussion and her assessment of the patient, she thinks we should stop nasal irrigation and let the air resorb through the sinuses. She may consider steroids.     Derek Narvaez MD, MSc  Ophthalmology PGY-3 resident physician  Pager: [phone]  Call my cell phone if any questions: [phone]

## 2023-05-23 ENCOUNTER — HOSPITAL ENCOUNTER (OUTPATIENT)
Dept: CARDIAC REHAB | Facility: HOSPITAL | Age: 68
Discharge: HOME | End: 2023-05-23
Attending: INTERNAL MEDICINE
Payer: MEDICARE

## 2023-05-23 PROCEDURE — G0239 OTH RESP PROC, GROUP: HCPCS

## 2023-05-25 ENCOUNTER — HOSPITAL ENCOUNTER (OUTPATIENT)
Dept: CARDIAC REHAB | Facility: HOSPITAL | Age: 68
Discharge: HOME | End: 2023-05-25
Attending: INTERNAL MEDICINE
Payer: MEDICARE

## 2023-05-25 PROCEDURE — G0239 OTH RESP PROC, GROUP: HCPCS

## 2023-05-30 ENCOUNTER — HOSPITAL ENCOUNTER (OUTPATIENT)
Dept: CARDIAC REHAB | Facility: HOSPITAL | Age: 68
Discharge: HOME | End: 2023-05-30
Attending: INTERNAL MEDICINE
Payer: MEDICARE

## 2023-05-30 PROCEDURE — G0239 OTH RESP PROC, GROUP: HCPCS

## 2023-06-13 ENCOUNTER — HOSPITAL ENCOUNTER (OUTPATIENT)
Dept: CARDIAC REHAB | Facility: HOSPITAL | Age: 68
Discharge: HOME | End: 2023-06-13
Attending: INTERNAL MEDICINE
Payer: MEDICARE

## 2023-06-13 PROCEDURE — G0239 OTH RESP PROC, GROUP: HCPCS

## 2023-06-20 ENCOUNTER — HOSPITAL ENCOUNTER (OUTPATIENT)
Dept: CARDIAC REHAB | Facility: HOSPITAL | Age: 68
Discharge: HOME | End: 2023-06-20
Attending: INTERNAL MEDICINE
Payer: MEDICARE

## 2023-06-20 PROCEDURE — 93798 PHYS/QHP OP CAR RHAB W/ECG: CPT

## 2023-06-22 ENCOUNTER — HOSPITAL ENCOUNTER (OUTPATIENT)
Dept: CARDIAC REHAB | Facility: HOSPITAL | Age: 68
Discharge: HOME | End: 2023-06-22
Attending: INTERNAL MEDICINE
Payer: MEDICARE

## 2023-06-22 PROCEDURE — G0239 OTH RESP PROC, GROUP: HCPCS

## 2023-06-27 ENCOUNTER — HOSPITAL ENCOUNTER (OUTPATIENT)
Dept: CARDIAC REHAB | Facility: HOSPITAL | Age: 68
Discharge: HOME | End: 2023-06-27
Attending: INTERNAL MEDICINE
Payer: MEDICARE

## 2023-06-27 PROCEDURE — G0239 OTH RESP PROC, GROUP: HCPCS

## 2023-06-29 ENCOUNTER — HOSPITAL ENCOUNTER (OUTPATIENT)
Dept: CARDIAC REHAB | Facility: HOSPITAL | Age: 68
Discharge: HOME | End: 2023-06-29
Attending: INTERNAL MEDICINE
Payer: MEDICARE

## 2023-06-29 PROCEDURE — G0239 OTH RESP PROC, GROUP: HCPCS

## 2023-07-06 ENCOUNTER — HOSPITAL ENCOUNTER (OUTPATIENT)
Dept: CARDIAC REHAB | Facility: HOSPITAL | Age: 68
Discharge: HOME | End: 2023-07-06
Attending: INTERNAL MEDICINE
Payer: MEDICARE

## 2023-07-06 PROCEDURE — G0238 OTH RESP PROC, INDIV: HCPCS

## 2023-07-11 ENCOUNTER — OFFICE VISIT (OUTPATIENT)
Dept: PULMONOLOGY | Facility: CLINIC | Age: 68
End: 2023-07-11
Payer: MEDICARE

## 2023-07-11 VITALS
SYSTOLIC BLOOD PRESSURE: 134 MMHG | DIASTOLIC BLOOD PRESSURE: 82 MMHG | HEART RATE: 68 BPM | OXYGEN SATURATION: 97 % | BODY MASS INDEX: 34.37 KG/M2 | WEIGHT: 243 LBS

## 2023-07-11 DIAGNOSIS — R06.09 DOE (DYSPNEA ON EXERTION): Primary | ICD-10-CM

## 2023-07-11 PROCEDURE — 99214 OFFICE O/P EST MOD 30 MIN: CPT | Performed by: INTERNAL MEDICINE

## 2023-07-11 NOTE — PROGRESS NOTES
Pulmonary Clinic Follow-up Visit    Impression: 65M who was previously healthy, with a history of COVID-19 infection x3, presents for follow up of dyspnea on exertion. As noted previously, his PFTs showed NO evidence of COPD based on the normal FEV1/FVC ratio. He does not have a history of smoking. He did have borderline bronchodilator response recently and a few years ago on PFTs from [facility]. He may have mild asthma vs. Reactive airways disease based on symptoms and PFTs.  He is doing very well on ICS/LABA and had a good response to pulmonary rehab. Lung exam and SpO2 are normal today.     Recommendations:  - continue Symbicort 160-4.5, 1 puff bid with spacer. Rinse/gargle/spit after use.  - continue albuterol rescue inhaler as needed  - encouraged Mak to remain active and exercise  - completed pulmonary rehab  - UTD with covid-19 (3 doses) and pneumococcal vaccination. Declines flu shot.      Follow up in 1 year or sooner if needed.    Gabriel Lassiter MD (Avi)  [facility] ([facility])  Clinic [phone]  Fax [phone]      CCx: asthma follow up    HPI: Interim history: I last saw Mak on 2/23 on a virtual visit. I recommended he add a spacer to his symbicort at the last visit. I also referred him to pulmonary rehab.  Today, he reports he's doing well. He thought pulmonary rehab was quite helpful.   He didn't feel the addition of the spacer made a huge difference.  No fevers, chills, cough hemoptysis or shortness of breath. No chest pain or chest pressure.   Uses rescue inhaler weekly, or less frequently.     ROS:  A 12-system review was obtained and was negative with the exception of the symptoms endorsed in the history of present illness.    PMH:  No past medical history on file.    PSH:  No past surgical history on file.    Allergies:  Allergies   Allergen Reactions     Penicillins Anaphylaxis     Other Environmental Allergy Unknown       Family HX:  No family history  on file.    Social Hx:  Social History     Socioeconomic History     Marital status:      Spouse name: Not on file     Number of children: Not on file     Years of education: Not on file     Highest education level: Not on file   Occupational History     Not on file   Tobacco Use     Smoking status: Never     Smokeless tobacco: Never   Vaping Use     Vaping Use: Never used   Substance and Sexual Activity     Alcohol use: Yes     Drug use: Never     Sexual activity: Not on file   Other Topics Concern     Not on file   Social History Narrative     Not on file     Social Determinants of Health     Financial Resource Strain: Not on file   Food Insecurity: Not on file   Transportation Needs: Not on file   Physical Activity: Not on file   Stress: Not on file   Social Connections: Not on file   Intimate Partner Violence: Not on file   Housing Stability: Not on file       Current Meds:  Current Outpatient Medications   Medication Sig Dispense Refill     acetaminophen (TYLENOL) 325 MG tablet [ACETAMINOPHEN (TYLENOL) 325 MG TABLET] Take 650 mg by mouth every 4 (four) hours as needed for pain or fever.       albuterol (PROAIR HFA/PROVENTIL HFA/VENTOLIN HFA) 108 (90 Base) MCG/ACT inhaler Inhale 1-2 puffs into the lungs as needed       budesonide-formoterol (SYMBICORT) 160-4.5 MCG/ACT Inhaler Inhale 1 puff into the lungs 2 times daily       candesartan (ATACAND) 32 MG tablet [CANDESARTAN (ATACAND) 32 MG TABLET] Take 32 mg by mouth daily.       citalopram (CELEXA) 20 MG tablet [CITALOPRAM (CELEXA) 20 MG TABLET] Take 20 mg by mouth daily.       fluticasone propionate (FLONASE) 50 mcg/actuation nasal spray [FLUTICASONE PROPIONATE (FLONASE) 50 MCG/ACTUATION NASAL SPRAY] Apply 1 spray into each nostril daily as needed for rhinitis.       ibuprofen (ADVIL,MOTRIN) 200 MG tablet [IBUPROFEN (ADVIL,MOTRIN) 200 MG TABLET] Take 400 mg by mouth every 4 (four) hours as needed for pain or fever.       levothyroxine (SYNTHROID,  LEVOTHROID) 150 MCG tablet [LEVOTHYROXINE (SYNTHROID, LEVOTHROID) 150 MCG TABLET] Take 150 mcg by mouth Daily at 6:00 am.        triamcinolone (KENALOG) 0.1 % cream [TRIAMCINOLONE (KENALOG) 0.1 % CREAM] Apply 1 application topically 2 (two) times a day as needed.         Physical Exam:  /82 (BP Location: Left arm, Patient Position: Chair, Cuff Size: Adult Large)   Pulse 68   Wt 110.2 kg (243 lb)   SpO2 97%   BMI 34.37 kg/m    Gen: awake, alert, oriented, no distress  HEENT: nasal turbinates are unremarkable, no oropharyngeal lesions, no cervical or supraclavicular lymphadenopathy  CV: RRR, no M/G/R  Resp: CTAB, no focal crackles or wheezes  Skin: no apparent rashes  Ext: no cyanosis, clubbing or edema  Neuro: alert, nonfocal    Labs:  Reviewed  CBC no eos in 2020  hgb 16.4      Imaging studies:  Personally reviewed    CXR from 11/25/2020  IMPRESSION:  A few faint reticulonodular opacities in the lower lungs laterally could represent either minimal airspace opacity and/or exaggeration of normal lung markings due to the markedly shallow inspiration. Lungs otherwise clear. Heart size and   pulmonary vascularity within normal limits.    Echo from [facility] March 2021  Final Conclusion Previous Study: 03/04/19    1. Technically challenging echocardiogram.    2. Normal left ventricular chamber size. Normal left ventricular systolic function. Calculated   left ventricular ejection fraction (modified    Marcelino technique) is 66 %. No regional wall motion abnormalities.  Mild concentric increase   in left ventricular wall thickness.    3. Indeterminate left ventricular diastolic function.    4. Normal right ventricular chamber size. Normal right ventricular systolic function.    5. Inferior vena cava was not well visualized.    6. No significant valvular heart disease.    7. When compared to the previous echocardiographic images of 03/04/19, there has been no   significant change.     Pulmonary Function  Testing  2/21/2023  FEV 2.2L, 70%  FVC 75%  Ratio 0.72  No BD response (10% improvement in FVC)  TLC 6.15L, 85%  DLco 116% [place]ina for hgb  Flow volume loop is normal.     PFTs from [facility], Jan 2021:  TLC 74% predicted  FEV1 54% with +BD response  Ratio 0.72  DLco normal.

## 2023-07-11 NOTE — LETTER
7/11/2023         RE: Mak Pollard  [address] [address]        Dear Colleague,    Thank you for referring your patient, Mak Pollard, to the [facility]. Please see a copy of my visit note below.    Pulmonary Clinic Follow-up Visit    Impression: 65M who was previously healthy, with a history of COVID-19 infection x3, presents for follow up of dyspnea on exertion. As noted previously, his PFTs showed NO evidence of COPD based on the normal FEV1/FVC ratio. He does not have a history of smoking. He did have borderline bronchodilator response recently and a few years ago on PFTs from [facility]. He may have mild asthma vs. Reactive airways disease based on symptoms and PFTs.  He is doing very well on ICS/LABA and had a good response to pulmonary rehab. Lung exam and SpO2 are normal today.     Recommendations:  - continue Symbicort 160-4.5, 1 puff bid with spacer. Rinse/gargle/spit after use.  - continue albuterol rescue inhaler as needed  - encouraged Mak to remain active and exercise  - completed pulmonary rehab  - UTD with covid-19 (3 doses) and pneumococcal vaccination. Declines flu shot.      Follow up in 1 year or sooner if needed.    Gabriel Lassiter MD (Avi)  [facility] (Former[facility])  Clinic [phone]  Fax [phone]      CCx: asthma follow up    HPI: Interim history: I last saw Mak on 2/23 on a virtual visit. I recommended he add a spacer to his symbicort at the last visit. I also referred him to pulmonary rehab.  Today, he reports he's doing well. He thought pulmonary rehab was quite helpful.   He didn't feel the addition of the spacer made a huge difference.  No fevers, chills, cough hemoptysis or shortness of breath. No chest pain or chest pressure.   Uses rescue inhaler weekly, or less frequently.     ROS:  A 12-system review was obtained and was negative with the exception of the symptoms endorsed in the  history of present illness.    PMH:  No past medical history on file.    PSH:  No past surgical history on file.    Allergies:  Allergies   Allergen Reactions     Penicillins Anaphylaxis     Other Environmental Allergy Unknown       Family HX:  No family history on file.    Social Hx:  Social History     Socioeconomic History     Marital status:      Spouse name: Not on file     Number of children: Not on file     Years of education: Not on file     Highest education level: Not on file   Occupational History     Not on file   Tobacco Use     Smoking status: Never     Smokeless tobacco: Never   Vaping Use     Vaping Use: Never used   Substance and Sexual Activity     Alcohol use: Yes     Drug use: Never     Sexual activity: Not on file   Other Topics Concern     Not on file   Social History Narrative     Not on file     Social Determinants of Health     Financial Resource Strain: Not on file   Food Insecurity: Not on file   Transportation Needs: Not on file   Physical Activity: Not on file   Stress: Not on file   Social Connections: Not on file   Intimate Partner Violence: Not on file   Housing Stability: Not on file       Current Meds:  Current Outpatient Medications   Medication Sig Dispense Refill     acetaminophen (TYLENOL) 325 MG tablet [ACETAMINOPHEN (TYLENOL) 325 MG TABLET] Take 650 mg by mouth every 4 (four) hours as needed for pain or fever.       albuterol (PROAIR HFA/PROVENTIL HFA/VENTOLIN HFA) 108 (90 Base) MCG/ACT inhaler Inhale 1-2 puffs into the lungs as needed       budesonide-formoterol (SYMBICORT) 160-4.5 MCG/ACT Inhaler Inhale 1 puff into the lungs 2 times daily       candesartan (ATACAND) 32 MG tablet [CANDESARTAN (ATACAND) 32 MG TABLET] Take 32 mg by mouth daily.       citalopram (CELEXA) 20 MG tablet [CITALOPRAM (CELEXA) 20 MG TABLET] Take 20 mg by mouth daily.       fluticasone propionate (FLONASE) 50 mcg/actuation nasal spray [FLUTICASONE PROPIONATE (FLONASE) 50 MCG/ACTUATION NASAL  SPRAY] Apply 1 spray into each nostril daily as needed for rhinitis.       ibuprofen (ADVIL,MOTRIN) 200 MG tablet [IBUPROFEN (ADVIL,MOTRIN) 200 MG TABLET] Take 400 mg by mouth every 4 (four) hours as needed for pain or fever.       levothyroxine (SYNTHROID, LEVOTHROID) 150 MCG tablet [LEVOTHYROXINE (SYNTHROID, LEVOTHROID) 150 MCG TABLET] Take 150 mcg by mouth Daily at 6:00 am.        triamcinolone (KENALOG) 0.1 % cream [TRIAMCINOLONE (KENALOG) 0.1 % CREAM] Apply 1 application topically 2 (two) times a day as needed.         Physical Exam:  /82 (BP Location: Left arm, Patient Position: Chair, Cuff Size: Adult Large)   Pulse 68   Wt 110.2 kg (243 lb)   SpO2 97%   BMI 34.37 kg/m    Gen: awake, alert, oriented, no distress  HEENT: nasal turbinates are unremarkable, no oropharyngeal lesions, no cervical or supraclavicular lymphadenopathy  CV: RRR, no M/G/R  Resp: CTAB, no focal crackles or wheezes  Skin: no apparent rashes  Ext: no cyanosis, clubbing or edema  Neuro: alert, nonfocal    Labs:  Reviewed  CBC no eos in 2020  hgb 16.4      Imaging studies:  Personally reviewed    CXR from 11/25/2020  IMPRESSION:  A few faint reticulonodular opacities in the lower lungs laterally could represent either minimal airspace opacity and/or exaggeration of normal lung markings due to the markedly shallow inspiration. Lungs otherwise clear. Heart size and   pulmonary vascularity within normal limits.    [facility] 2021  Final Conclusion Previous Study: 03/04/19    1. Technically challenging echocardiogram.    2. Normal left ventricular chamber size. Normal left ventricular systolic function. Calculated   left ventricular ejection fraction (modified    Marcelino technique) is 66 %. No regional wall motion abnormalities.  Mild concentric increase   in left ventricular wall thickness.    3. Indeterminate left ventricular diastolic function.    4. Normal right ventricular chamber size. Normal right ventricular  systolic function.    5. Inferior vena cava was not well visualized.    6. No significant valvular heart disease.    7. When compared to the previous echocardiographic images of 03/04/19, there has been no   significant change.     Pulmonary Function Testing  2/21/2023  FEV 2.2L, 70%  FVC 75%  Ratio 0.72  No BD response (10% improvement in FVC)  TLC 6.15L, 85%  DLco 116% [place] for hgb  Flow volume loop is normal.     PFTs from [facility], Jan 2021:  TLC 74% predicted  FEV1 54% with +BD response  Ratio 0.72  DLco normal.         Again, thank you for allowing me to participate in the care of your patient.        Sincerely,        Gabriel Lassiter MD

## 2023-11-28 ENCOUNTER — NURSE TRIAGE (OUTPATIENT)
Dept: CARDIOLOGY | Facility: CLINIC | Age: 68
End: 2023-11-28
Payer: MEDICARE

## 2023-11-28 NOTE — TELEPHONE ENCOUNTER
Dr. Freeman,  Please see patient update below.  Follow up is scheduled with Jennifer 12/15/23.  Any new orders or recommendations?  Thank you,  Crista

## 2023-11-28 NOTE — TELEPHONE ENCOUNTER
"Received a call from the call center to discuss chest pain.  Spoke to Mak, who says he had an episode of chest pain last Tuesday (11/21/23) when driving up to [place]. He describes it as a sharp pain-\"like being stuck with a pin\", in the upper right chest that \"went through him\" and into the shoulder, neck and up to his eye and sinus area. He says it lasted 1-1.5 hours. He denies nausea, sweating, shortness of breath, dizziness. He says this chest pain was different from what he has had in the past. He says he had a similar episode two nights ago lasting about 30 minutes. He says the episodes started last summer, so Dr. Freeman is not aware of it.  He denies having chest pain currently.  He does not check his BP at home.  Advised a message will be sent to [facility] for follow up.    1. LOCATION: \"Where does it hurt?\" Right chest  2. RADIATION: \"Does the pain go anywhere else?\" (e.g., into neck, jaw, arms, back) shoulder, neck, eye, sinus cavity  3. ONSET: \"When did the chest pain begin?\" (Minutes, hours or days) while driving  4. PATTERN: \"Does the pain come and go, or has it been constant since it started?\" \"Does it get worse with exertion?\"  5. DURATION: \"How long does it last\" (e.g., seconds, minutes, hours)1-1.5 hours, another episode was 30 minutes  6. SEVERITY: \"How bad is the pain?\" (e.g., Scale 1-10; mild, moderate, or severe) Moderate  - MILD (1-3): doesn't interfere with normal activities  - MODERATE (4-7): interferes with normal activities or awakens from sleep  - SEVERE (8-10): excruciating pain, unable to do any normal activities  7. CARDIAC RISK FACTORS: \"Do you have any history of heart problems or risk factors for heart disease?\" (e.g., angina, prior heart attack; diabetes, high blood pressure, high cholesterol, smoker, or strong family history of heart disease) HTN  8. PULMONARY RISK FACTORS: \"Do you have any history of lung disease?\" (e.g., blood clots in lung, asthma, emphysema, birth " "control pills)  9. CAUSE: \"What do you think is causing the chest pain?\"  10. OTHER SYMPTOMS: \"Do you have any other symptoms?\" (e.g., dizziness, nausea, vomiting, sweating, fever, difficulty breathing, cough) denies  11. PREGNANCY: \"Is there any chance you are pregnant?\" \"When was your last menstrual period?\"  Additional Information    Negative: SEVERE chest pain    Protocols used: Chest Pain-A-[place]    "

## 2023-11-29 NOTE — TELEPHONE ENCOUNTER
Reached out to patient with recommendation below. Patient is in agreement and comfortable with plan. He will follow up as already scheduled 12/15/23 and will call in the interim with questions.  ----------------------------   Cleopatra Freeman MD  You4 hours ago (7:34 AM)     FR  The way the chest discomfort is described in the narrative continues to sound quite atypical for cardiac etiology, ischemic or otherwise.  If Mak is comfortable with it, feel could simply review with Jennifer at time of upcoming visit.  If Mak feels more immediate evaluation required, could perhaps get in to see primary provider to discuss further.  Thanks.

## 2023-12-20 NOTE — PROGRESS NOTES
HEART CARE ENCOUNTER NOTE      [facility]  [phone]      Assessment/Recommendations   Assessment:   Chest discomfort: In February 2023 patient was seen in clinic and had noted some atypical chest discomfort that had occurred on 2 occasions and was nonexertional in nature.  Patient had been quite active at that time without any exertional angina or shortness of breath.  Given the atypical nature of his that to continue monitoring and if having recurrent chest discomfort could reconsider a stress test.  Patient presents today noting 3 episodes of recent severe sharp chest pain that went into the shoulders and neck.  Patient denies these being exertional in nature.  Improved after 1 to 1-1/2 hours.  Hypertension: Well controlled on Candesartan 32 mg daily    Plan:   Will arrange for CT angiogram to further evaluate patient's atypical chest pain given the recurrence of it and with the radiating into his shoulders and neck.  Continue current medications  Follow-up as needed pending results.        The level of medical decision making during this visit was of moderate complexity.       History of Present Illness/Subjective    HPI: Mak Pollard is a 67 year old male with PMHx of lower extremity edema and chest discomfort presents for follow-up. In February 2023 patient was seen in clinic and had noted some atypical chest discomfort that had occurred on 2 occasions and was nonexertional in nature.  Patient had been quite active at that time without any exertional angina or shortness of breath.  Given the atypical nature of his that to continue monitoring and if having recurrent chest discomfort could reconsider a stress test.     Patient had called 11/21/2023 when driving up to [place] with sharp chest pain going into his shoulders, neck, and up to his eyes and sinus area.  Patient notes that lasted 1 to 1-1/2 hours.  Denied nausea, sweating, shortness of breath or dizziness with this.  Patient  noted having a similar episode 2 nights previously that lasted 30 minutes.  Has had about 3 episodes total.  Patient notes palpating the area does not change the pain. He denies fatigue, lightheadedness, shortness of breath, dyspnea on exertion, orthopnea, PND, palpitations, abdominal fullness/bloating, and lower extremity edema.        Echocardiogram 3/31/2021:   Technically difficult study.   Normal left ventricular size and systolic performance with ejection fraction of 65 to 70%.   Mild increase in left ventricular wall thickness.   No significant valvular heart disease.   Normal right ventricular size and systolic performance.   Mild to moderate left atrial enlargement.  Right term of normal dimension.      Holter monitor 3/5/2019:   Findings are consistent with completely benign 24-hour Holter monitor with no tachycardia or bradycardic arrhythmias or anything from an electrical aspect to explain patient's syncope.        Physical Examination  Review of Systems   Vitals: /76 (BP Location: Right arm, Patient Position: Sitting, Cuff Size: Adult Large)   Pulse 76   Resp 14   Wt 114.3 kg (252 lb)   BMI 35.65 kg/m    BMI= Body mass index is 35.65 kg/m .  Wt Readings from Last 3 Encounters:   07/11/23 110.2 kg (243 lb)   02/28/23 109.2 kg (240 lb 11.2 oz)   11/17/21 115.7 kg (255 lb)           ENT/Mouth: membranes moist, no oral lesions or bleeding gums.      EYES:  no scleral icterus, normal conjunctivae                    Neck: No carotid bruit or thyromegaly   Chest/Lungs:   lungs are clear to auscultation, no rales or wheezing, equal chest wall expansion    Cardiovascular:   Regular. Normal first and second heart sounds with no murmurs, rubs, or gallops; the carotid, radial and posterior tibial pulses are intact, no edema bilaterally        Extremities: no cyanosis or clubbing   Skin: no xanthelasma, warm.    Neurologic: no tremors     Psychiatric: alert and oriented x3, calm        Please refer above  for cardiac ROS details.        Medical History  Surgical History Family History Social History   No past medical history on file.  No past surgical history on file.  No family history on file.     Social History     Socioeconomic History    Marital status:      Spouse name: Not on file    Number of children: Not on file    Years of education: Not on file    Highest education level: Not on file   Occupational History    Not on file   Tobacco Use    Smoking status: Never    Smokeless tobacco: Never   Vaping Use    Vaping Use: Never used   Substance and Sexual Activity    Alcohol use: Yes    Drug use: Never    Sexual activity: Not on file   Other Topics Concern    Not on file   Social History Narrative    Not on file     Social Determinants of Health     Financial Resource Strain: Not on file   Food Insecurity: Not on file   Transportation Needs: Not on file   Physical Activity: Not on file   Stress: Not on file   Social Connections: Not on file   Interpersonal Safety: Not on file   Housing Stability: Not on file           Medications  Allergies   Current Outpatient Medications   Medication Sig Dispense Refill    acetaminophen (TYLENOL) 325 MG tablet [ACETAMINOPHEN (TYLENOL) 325 MG TABLET] Take 650 mg by mouth every 4 (four) hours as needed for pain or fever.      albuterol (PROAIR HFA/PROVENTIL HFA/VENTOLIN HFA) 108 (90 Base) MCG/ACT inhaler Inhale 1-2 puffs into the lungs as needed      budesonide-formoterol (SYMBICORT) 160-4.5 MCG/ACT Inhaler Inhale 1 puff into the lungs 2 times daily      candesartan (ATACAND) 32 MG tablet [CANDESARTAN (ATACAND) 32 MG TABLET] Take 32 mg by mouth daily.      citalopram (CELEXA) 20 MG tablet [CITALOPRAM (CELEXA) 20 MG TABLET] Take 20 mg by mouth daily.      fluticasone propionate (FLONASE) 50 mcg/actuation nasal spray [FLUTICASONE PROPIONATE (FLONASE) 50 MCG/ACTUATION NASAL SPRAY] Apply 1 spray into each nostril daily as needed for rhinitis.      ibuprofen (ADVIL,MOTRIN) 200  "MG tablet [IBUPROFEN (ADVIL,MOTRIN) 200 MG TABLET] Take 400 mg by mouth every 4 (four) hours as needed for pain or fever.      levothyroxine (SYNTHROID, LEVOTHROID) 150 MCG tablet [LEVOTHYROXINE (SYNTHROID, LEVOTHROID) 150 MCG TABLET] Take 150 mcg by mouth Daily at 6:00 am.       triamcinolone (KENALOG) 0.1 % cream [TRIAMCINOLONE (KENALOG) 0.1 % CREAM] Apply 1 application topically 2 (two) times a day as needed.         Allergies   Allergen Reactions    Penicillins Anaphylaxis    Other Environmental Allergy Unknown          Lab Results    Chemistry/lipid CBC Cardiac Enzymes/BNP/TSH/INR   No results for input(s): \"CHOL\", \"HDL\", \"LDL\", \"TRIG\", \"CHOLHDLRATIO\" in the last 50064 hours.  No results for input(s): \"LDL\" in the last 69455 hours.  Recent Labs   Lab Test 11/26/20  0453      POTASSIUM 4.0   CHLORIDE 101   CO2 26   GLC 95   BUN 14   CR 0.93   GFRESTIMATED >60   MARILUZ 7.8*     Recent Labs   Lab Test 11/26/20  0453 11/25/20  1154 06/06/19  0832   CR 0.93 1.36* 0.9     No results for input(s): \"A1C\" in the last 38226 hours.       Recent Labs   Lab Test 02/21/23  1436 11/26/20  0454   WBC  --  3.5*   HGB 16.4 13.4*   HCT  --  40.6   MCV  --  93   PLT  --  120*     Recent Labs   Lab Test 02/21/23  1436 11/26/20  0454 11/25/20  1154   HGB 16.4 13.4* 14.9    Recent Labs   Lab Test 11/26/20  0453 11/25/20  1154   TROPONINI 0.02 0.01     No results for input(s): \"BNP\", \"NTBNPI\", \"NTBNP\" in the last 92569 hours.  No results for input(s): \"TSH\" in the last 20576 hours.  Recent Labs   Lab Test 11/25/20  1718   INR 1.02        Rhonda Barahona PA-C                                       "

## 2023-12-21 ENCOUNTER — OFFICE VISIT (OUTPATIENT)
Dept: CARDIOLOGY | Facility: CLINIC | Age: 68
End: 2023-12-21
Payer: MEDICARE

## 2023-12-21 VITALS
RESPIRATION RATE: 14 BRPM | SYSTOLIC BLOOD PRESSURE: 112 MMHG | HEART RATE: 76 BPM | WEIGHT: 252 LBS | DIASTOLIC BLOOD PRESSURE: 76 MMHG | BODY MASS INDEX: 35.65 KG/M2

## 2023-12-21 DIAGNOSIS — E66.01 CLASS 2 SEVERE OBESITY DUE TO EXCESS CALORIES WITH SERIOUS COMORBIDITY AND BODY MASS INDEX (BMI) OF 35.0 TO 35.9 IN ADULT (H): ICD-10-CM

## 2023-12-21 DIAGNOSIS — R07.89 OTHER CHEST PAIN: Primary | ICD-10-CM

## 2023-12-21 DIAGNOSIS — E66.812 CLASS 2 SEVERE OBESITY DUE TO EXCESS CALORIES WITH SERIOUS COMORBIDITY AND BODY MASS INDEX (BMI) OF 35.0 TO 35.9 IN ADULT (H): ICD-10-CM

## 2023-12-21 DIAGNOSIS — I10 PRIMARY HYPERTENSION: ICD-10-CM

## 2023-12-21 PROCEDURE — 99214 OFFICE O/P EST MOD 30 MIN: CPT | Performed by: STUDENT IN AN ORGANIZED HEALTH CARE EDUCATION/TRAINING PROGRAM

## 2023-12-21 NOTE — PATIENT INSTRUCTIONS
Mak Pollard,    It was a pleasure to see you today at the [facility].     My recommendations after this visit include:    - Continue current medications.  - Schedule CT angiogram to further evaluate atypical chest pain  - Follow up with Dr. Freeman as needed pending results    - Please call [phone], if you have any questions or concerns      Rhonda Barahona PA-C

## 2023-12-21 NOTE — LETTER
12/21/2023    Joanne Winkler MD  [address] [facility] [address]    RE: Mak RACHEL Latrice       Dear Colleague,     I had the pleasure of seeing Mak Pollard in the [facility].    HEART CARE ENCOUNTER NOTE      [facility]  [phone]      Assessment/Recommendations   Assessment:   Chest discomfort: In February 2023 patient was seen in clinic and had noted some atypical chest discomfort that had occurred on 2 occasions and was nonexertional in nature.  Patient had been quite active at that time without any exertional angina or shortness of breath.  Given the atypical nature of his that to continue monitoring and if having recurrent chest discomfort could reconsider a stress test.  Patient presents today noting 3 episodes of recent severe sharp chest pain that went into the shoulders and neck.  Patient denies these being exertional in nature.  Improved after 1 to 1-1/2 hours.  Hypertension: Well controlled on Candesartan 32 mg daily    Plan:   Will arrange for CT angiogram to further evaluate patient's atypical chest pain given the recurrence of it and with the radiating into his shoulders and neck.  Continue current medications  Follow-up as needed pending results.        The level of medical decision making during this visit was of moderate complexity.       History of Present Illness/Subjective    HPI: Mak Pollard is a 67 year old male with PMHx of lower extremity edema and chest discomfort presents for follow-up. In February 2023 patient was seen in clinic and had noted some atypical chest discomfort that had occurred on 2 occasions and was nonexertional in nature.  Patient had been quite active at that time without any exertional angina or shortness of breath.  Given the atypical nature of his that to continue monitoring and if having recurrent chest discomfort could reconsider a stress test.     Patient had called 11/21/2023 when driving up to  [place] with sharp chest pain going into his shoulders, neck, and up to his eyes and sinus area.  Patient notes that lasted 1 to 1-1/2 hours.  Denied nausea, sweating, shortness of breath or dizziness with this.  Patient noted having a similar episode 2 nights previously that lasted 30 minutes.  Has had about 3 episodes total.  Patient notes palpating the area does not change the pain. He denies fatigue, lightheadedness, shortness of breath, dyspnea on exertion, orthopnea, PND, palpitations, abdominal fullness/bloating, and lower extremity edema.        Echocardiogram 3/31/2021:   Technically difficult study.   Normal left ventricular size and systolic performance with ejection fraction of 65 to 70%.   Mild increase in left ventricular wall thickness.   No significant valvular heart disease.   Normal right ventricular size and systolic performance.   Mild to moderate left atrial enlargement.  Right term of normal dimension.      Holter monitor 3/5/2019:   Findings are consistent with completely benign 24-hour Holter monitor with no tachycardia or bradycardic arrhythmias or anything from an electrical aspect to explain patient's syncope.        Physical Examination  Review of Systems   Vitals: /76 (BP Location: Right arm, Patient Position: Sitting, Cuff Size: Adult Large)   Pulse 76   Resp 14   Wt 114.3 kg (252 lb)   BMI 35.65 kg/m    BMI= Body mass index is 35.65 kg/m .  Wt Readings from Last 3 Encounters:   07/11/23 110.2 kg (243 lb)   02/28/23 109.2 kg (240 lb 11.2 oz)   11/17/21 115.7 kg (255 lb)           ENT/Mouth: membranes moist, no oral lesions or bleeding gums.      EYES:  no scleral icterus, normal conjunctivae                    Neck: No carotid bruit or thyromegaly   Chest/Lungs:   lungs are clear to auscultation, no rales or wheezing, equal chest wall expansion    Cardiovascular:   Regular. Normal first and second heart sounds with no murmurs, rubs, or gallops; the carotid, radial and posterior  tibial pulses are intact, no edema bilaterally        Extremities: no cyanosis or clubbing   Skin: no xanthelasma, warm.    Neurologic: no tremors     Psychiatric: alert and oriented x3, calm        Please refer above for cardiac ROS details.        Medical History  Surgical History Family History Social History   No past medical history on file.  No past surgical history on file.  No family history on file.     Social History     Socioeconomic History    Marital status:      Spouse name: Not on file    Number of children: Not on file    Years of education: Not on file    Highest education level: Not on file   Occupational History    Not on file   Tobacco Use    Smoking status: Never    Smokeless tobacco: Never   Vaping Use    Vaping Use: Never used   Substance and Sexual Activity    Alcohol use: Yes    Drug use: Never    Sexual activity: Not on file   Other Topics Concern    Not on file   Social History Narrative    Not on file     Social Determinants of Health     Financial Resource Strain: Not on file   Food Insecurity: Not on file   Transportation Needs: Not on file   Physical Activity: Not on file   Stress: Not on file   Social Connections: Not on file   Interpersonal Safety: Not on file   Housing Stability: Not on file           Medications  Allergies   Current Outpatient Medications   Medication Sig Dispense Refill    acetaminophen (TYLENOL) 325 MG tablet [ACETAMINOPHEN (TYLENOL) 325 MG TABLET] Take 650 mg by mouth every 4 (four) hours as needed for pain or fever.      albuterol (PROAIR HFA/PROVENTIL HFA/VENTOLIN HFA) 108 (90 Base) MCG/ACT inhaler Inhale 1-2 puffs into the lungs as needed      budesonide-formoterol (SYMBICORT) 160-4.5 MCG/ACT Inhaler Inhale 1 puff into the lungs 2 times daily      candesartan (ATACAND) 32 MG tablet [CANDESARTAN (ATACAND) 32 MG TABLET] Take 32 mg by mouth daily.      citalopram (CELEXA) 20 MG tablet [CITALOPRAM (CELEXA) 20 MG TABLET] Take 20 mg by mouth daily.       "fluticasone propionate (FLONASE) 50 mcg/actuation nasal spray [FLUTICASONE PROPIONATE (FLONASE) 50 MCG/ACTUATION NASAL SPRAY] Apply 1 spray into each nostril daily as needed for rhinitis.      ibuprofen (ADVIL,MOTRIN) 200 MG tablet [IBUPROFEN (ADVIL,MOTRIN) 200 MG TABLET] Take 400 mg by mouth every 4 (four) hours as needed for pain or fever.      levothyroxine (SYNTHROID, LEVOTHROID) 150 MCG tablet [LEVOTHYROXINE (SYNTHROID, LEVOTHROID) 150 MCG TABLET] Take 150 mcg by mouth Daily at 6:00 am.       triamcinolone (KENALOG) 0.1 % cream [TRIAMCINOLONE (KENALOG) 0.1 % CREAM] Apply 1 application topically 2 (two) times a day as needed.         Allergies   Allergen Reactions    Penicillins Anaphylaxis    Other Environmental Allergy Unknown          Lab Results    Chemistry/lipid CBC Cardiac Enzymes/BNP/TSH/INR   No results for input(s): \"CHOL\", \"HDL\", \"LDL\", \"TRIG\", \"CHOLHDLRATIO\" in the last 78956 hours.  No results for input(s): \"LDL\" in the last 75140 hours.  Recent Labs   Lab Test 11/26/20  0453      POTASSIUM 4.0   CHLORIDE 101   CO2 26   GLC 95   BUN 14   CR 0.93   GFRESTIMATED >60   MARILUZ 7.8*     Recent Labs   Lab Test 11/26/20  0453 11/25/20  1154 06/06/19  0832   CR 0.93 1.36* 0.9     No results for input(s): \"A1C\" in the last 54226 hours.       Recent Labs   Lab Test 02/21/23  1436 11/26/20  0454   WBC  --  3.5*   HGB 16.4 13.4*   HCT  --  40.6   MCV  --  93   PLT  --  120*     Recent Labs   Lab Test 02/21/23  1436 11/26/20  0454 11/25/20  1154   HGB 16.4 13.4* 14.9    Recent Labs   Lab Test 11/26/20  0453 11/25/20  1154   TROPONINI 0.02 0.01     No results for input(s): \"BNP\", \"NTBNPI\", \"NTBNP\" in the last 28029 hours.  No results for input(s): \"TSH\" in the last 28335 hours.  Recent Labs   Lab Test 11/25/20  1718   INR 1.02        Rhonda Barahona PA-C      Thank you for allowing me to participate in the care of your patient.      Sincerely,     Rhonda Barahona PA-C     [facility]  cc: No referring provider defined for this encounter.

## 2023-12-27 ENCOUNTER — HOSPITAL ENCOUNTER (OUTPATIENT)
Dept: CT IMAGING | Facility: CLINIC | Age: 68
Discharge: HOME | End: 2023-12-27
Attending: STUDENT IN AN ORGANIZED HEALTH CARE EDUCATION/TRAINING PROGRAM | Admitting: STUDENT IN AN ORGANIZED HEALTH CARE EDUCATION/TRAINING PROGRAM
Payer: MEDICARE

## 2023-12-27 VITALS
HEIGHT: 71 IN | BODY MASS INDEX: 35 KG/M2 | DIASTOLIC BLOOD PRESSURE: 63 MMHG | WEIGHT: 250 LBS | SYSTOLIC BLOOD PRESSURE: 107 MMHG

## 2023-12-27 DIAGNOSIS — E66.01 CLASS 2 SEVERE OBESITY DUE TO EXCESS CALORIES WITH SERIOUS COMORBIDITY AND BODY MASS INDEX (BMI) OF 35.0 TO 35.9 IN ADULT (H): ICD-10-CM

## 2023-12-27 DIAGNOSIS — E66.812 CLASS 2 SEVERE OBESITY DUE TO EXCESS CALORIES WITH SERIOUS COMORBIDITY AND BODY MASS INDEX (BMI) OF 35.0 TO 35.9 IN ADULT (H): ICD-10-CM

## 2023-12-27 DIAGNOSIS — R07.89 OTHER CHEST PAIN: ICD-10-CM

## 2023-12-27 DIAGNOSIS — R93.1 ABNORMAL FINDINGS ON DIAGNOSTIC IMAGING OF HEART AND CORONARY CIRCULATION: ICD-10-CM

## 2023-12-27 DIAGNOSIS — I10 PRIMARY HYPERTENSION: ICD-10-CM

## 2023-12-27 LAB
BSA FOR ECHO PROCEDURE: 0 M2
BSA FOR ECHO PROCEDURE: 0 M2
CREAT BLD-MCNC: 1 MG/DL (ref 0.7–1.3)
EGFRCR SERPLBLD CKD-EPI 2021: >60 ML/MIN/1.73M2

## 2023-12-27 PROCEDURE — G1010 CDSM STANSON: HCPCS

## 2023-12-27 PROCEDURE — G1010 CDSM STANSON: HCPCS | Performed by: INTERNAL MEDICINE

## 2023-12-27 PROCEDURE — 82565 ASSAY OF CREATININE: CPT

## 2023-12-27 PROCEDURE — 250N000011 HC RX IP 250 OP 636: Mod: JZ | Performed by: STUDENT IN AN ORGANIZED HEALTH CARE EDUCATION/TRAINING PROGRAM

## 2023-12-27 PROCEDURE — 75574 CT ANGIO HRT W/3D IMAGE: CPT | Mod: ME

## 2023-12-27 PROCEDURE — 75574 CT ANGIO HRT W/3D IMAGE: CPT | Mod: 26 | Performed by: INTERNAL MEDICINE

## 2023-12-27 PROCEDURE — 250N000009 HC RX 250: Performed by: STUDENT IN AN ORGANIZED HEALTH CARE EDUCATION/TRAINING PROGRAM

## 2023-12-27 PROCEDURE — 250N000013 HC RX MED GY IP 250 OP 250 PS 637: Performed by: STUDENT IN AN ORGANIZED HEALTH CARE EDUCATION/TRAINING PROGRAM

## 2023-12-27 PROCEDURE — 0504T CT FFR: CPT | Performed by: INTERNAL MEDICINE

## 2023-12-27 PROCEDURE — 0503T CT FFR: CPT

## 2023-12-27 RX ORDER — NITROGLYCERIN 0.4 MG/1
0.4 TABLET SUBLINGUAL ONCE
Status: COMPLETED | OUTPATIENT
Start: 2023-12-27 | End: 2023-12-27

## 2023-12-27 RX ORDER — DILTIAZEM HYDROCHLORIDE 5 MG/ML
10 INJECTION INTRAVENOUS
Status: DISCONTINUED | OUTPATIENT
Start: 2023-12-27 | End: 2023-12-28 | Stop reason: HOSPADM

## 2023-12-27 RX ORDER — METOPROLOL TARTRATE 1 MG/ML
5 INJECTION, SOLUTION INTRAVENOUS
Status: DISCONTINUED | OUTPATIENT
Start: 2023-12-27 | End: 2023-12-28 | Stop reason: HOSPADM

## 2023-12-27 RX ORDER — LIDOCAINE 40 MG/G
CREAM TOPICAL
Status: DISCONTINUED | OUTPATIENT
Start: 2023-12-27 | End: 2023-12-28 | Stop reason: HOSPADM

## 2023-12-27 RX ORDER — IOPAMIDOL 755 MG/ML
100 INJECTION, SOLUTION INTRAVASCULAR ONCE
Status: COMPLETED | OUTPATIENT
Start: 2023-12-27 | End: 2023-12-27

## 2023-12-27 RX ORDER — DILTIAZEM HYDROCHLORIDE 5 MG/ML
5 INJECTION INTRAVENOUS
Status: DISCONTINUED | OUTPATIENT
Start: 2023-12-27 | End: 2023-12-28 | Stop reason: HOSPADM

## 2023-12-27 RX ADMIN — NITROGLYCERIN 0.4 MG: 0.4 TABLET SUBLINGUAL at 07:30

## 2023-12-27 RX ADMIN — METOPROLOL TARTRATE 5 MG: 1 INJECTION, SOLUTION INTRAVENOUS at 07:27

## 2023-12-27 RX ADMIN — IOPAMIDOL 100 ML: 755 INJECTION, SOLUTION INTRAVENOUS at 07:32

## 2023-12-29 DIAGNOSIS — E66.811 CLASS 1 OBESITY WITH BODY MASS INDEX (BMI) OF 32.0 TO 32.9 IN ADULT: ICD-10-CM

## 2023-12-29 DIAGNOSIS — R93.1 ABNORMAL FINDINGS ON DIAGNOSTIC IMAGING OF HEART AND CORONARY CIRCULATION: Primary | ICD-10-CM

## 2023-12-29 DIAGNOSIS — I10 BENIGN ESSENTIAL HTN: ICD-10-CM

## 2023-12-29 NOTE — RESULT ENCOUNTER NOTE
We could certainly repeat his cholesterol check prior to having him start the medication and then repeat labs in 3 months.

## 2024-01-03 ENCOUNTER — LAB (OUTPATIENT)
Dept: CARDIOLOGY | Facility: CLINIC | Age: 69
End: 2024-01-03
Payer: MEDICARE

## 2024-01-03 DIAGNOSIS — I10 BENIGN ESSENTIAL HTN: ICD-10-CM

## 2024-01-03 DIAGNOSIS — R93.1 ABNORMAL FINDINGS ON DIAGNOSTIC IMAGING OF HEART AND CORONARY CIRCULATION: ICD-10-CM

## 2024-01-03 DIAGNOSIS — E66.811 CLASS 1 OBESITY WITH BODY MASS INDEX (BMI) OF 32.0 TO 32.9 IN ADULT: ICD-10-CM

## 2024-01-03 DIAGNOSIS — R93.1 ABNORMAL FINDINGS ON DIAGNOSTIC IMAGING OF HEART AND CORONARY CIRCULATION: Primary | ICD-10-CM

## 2024-01-03 LAB
CHOLEST SERPL-MCNC: 141 MG/DL
FASTING STATUS PATIENT QL REPORTED: YES
HDLC SERPL-MCNC: 55 MG/DL
LDLC SERPL CALC-MCNC: 66 MG/DL
NONHDLC SERPL-MCNC: 86 MG/DL
TRIGL SERPL-MCNC: 98 MG/DL

## 2024-01-03 PROCEDURE — 36415 COLL VENOUS BLD VENIPUNCTURE: CPT

## 2024-01-03 PROCEDURE — 80061 LIPID PANEL: CPT

## 2024-01-03 RX ORDER — ATORVASTATIN CALCIUM 20 MG/1
20 TABLET, FILM COATED ORAL DAILY
Qty: 90 TABLET | Refills: 3 | Status: SHIPPED | OUTPATIENT
Start: 2024-01-03

## 2024-03-09 ENCOUNTER — HEALTH MAINTENANCE LETTER (OUTPATIENT)
Age: 69
End: 2024-03-09

## 2024-07-31 ENCOUNTER — OFFICE VISIT (OUTPATIENT)
Dept: PULMONOLOGY | Facility: CLINIC | Age: 69
End: 2024-07-31
Payer: MEDICARE

## 2024-07-31 VITALS
BODY MASS INDEX: 34.08 KG/M2 | WEIGHT: 241 LBS | SYSTOLIC BLOOD PRESSURE: 126 MMHG | HEART RATE: 75 BPM | DIASTOLIC BLOOD PRESSURE: 80 MMHG | OXYGEN SATURATION: 96 %

## 2024-07-31 DIAGNOSIS — J45.909 REACTIVE AIRWAY DISEASE WITHOUT COMPLICATION, UNSPECIFIED ASTHMA SEVERITY, UNSPECIFIED WHETHER PERSISTENT: ICD-10-CM

## 2024-07-31 DIAGNOSIS — J45.909 REACTIVE AIRWAY DISEASE WITHOUT COMPLICATION, UNSPECIFIED ASTHMA SEVERITY, UNSPECIFIED WHETHER PERSISTENT: Primary | ICD-10-CM

## 2024-07-31 DIAGNOSIS — J44.9 COPD (CHRONIC OBSTRUCTIVE PULMONARY DISEASE) (H): Primary | ICD-10-CM

## 2024-07-31 PROCEDURE — G2211 COMPLEX E/M VISIT ADD ON: HCPCS | Performed by: INTERNAL MEDICINE

## 2024-07-31 PROCEDURE — 99213 OFFICE O/P EST LOW 20 MIN: CPT | Performed by: INTERNAL MEDICINE

## 2024-07-31 RX ORDER — BUDESONIDE AND FORMOTEROL FUMARATE DIHYDRATE 80; 4.5 UG/1; UG/1
2 AEROSOL RESPIRATORY (INHALATION) 2 TIMES DAILY
Qty: 10.2 G | Refills: 3 | Status: SHIPPED | OUTPATIENT
Start: 2024-07-31

## 2024-07-31 RX ORDER — BUDESONIDE AND FORMOTEROL FUMARATE DIHYDRATE 80; 4.5 UG/1; UG/1
2 AEROSOL RESPIRATORY (INHALATION) 2 TIMES DAILY
Qty: 10.2 G | Refills: 11 | Status: SHIPPED | OUTPATIENT
Start: 2024-07-31

## 2024-07-31 NOTE — LETTER
"7/31/2024      Mak Pollard  [address] [address]      Dear Colleague,    Thank you for referring your patient, Mak Pollard, to the [facility]. Please see a copy of my visit note below.    Pulmonary Clinic Follow-up Visit    Impression: 68M who was previously healthy, with a history of COVID-19 infection x3, presents for follow up of dyspnea on exertion. As noted previously, his PFTs showed NO evidence of COPD based on the normal FEV1/FVC ratio. He does not have a history of smoking. He did have borderline bronchodilator response recently and a few years ago on PFTs from [facility]. He may have mild asthma vs. Reactive airways disease based on symptoms and PFTs.  He is doing very well on ICS/LABA and had a good response to pulmonary rehab. Lung exam and SpO2 are normal today.     Recommendations:  - continue Symbicort; will decrease to lower dose, 80-4.5, 1 puff bid with spacer. Rinse/gargle/spit after use. He will let me now if he wants to go back to the higher dose.  - continue albuterol rescue inhaler as needed  - encouraged Mak to remain active and exercise  - completed pulmonary rehab  - UTD with covid-19 (3 doses) and pneumococcal vaccination. Should get flu shot this Fall. Should also get RSV vaccine; information given.     Follow up in 1 year or sooner if needed.    The longitudinal plan of care for the diagnosis(es)/condition(s) as documented were addressed during this visit. Due to the added complexity in care, I will continue to support Mak in the subsequent management and with ongoing continuity of care.     Gabriel Lassiter MD (Avi)  [facility] Pulmonary & Critical Care ([facility])  Clinic [phone]  Fax [phone]      CCx: asthma follow up    HPI: Interim history: I last saw Mak on 7/11/2023. Since that time, he reports he's generally doing well. Rare use of rescue inhaler. Occasional \"hiccup\" sensation that makes it hard for him " to take a deep breath, but he does not think this is related to his lungs.     ROS:  A 12-system review was obtained and was negative with the exception of the symptoms endorsed in the history of present illness.    PMH:  No past medical history on file.    PSH:  No past surgical history on file.    Allergies:  Allergies   Allergen Reactions     Penicillins Anaphylaxis     Other Environmental Allergy Unknown       Family HX:  No family history on file.    Social Hx:  Social History     Socioeconomic History     Marital status:      Spouse name: Not on file     Number of children: Not on file     Years of education: Not on file     Highest education level: Not on file   Occupational History     Not on file   Tobacco Use     Smoking status: Never     Smokeless tobacco: Never   Vaping Use     Vaping status: Never Used   Substance and Sexual Activity     Alcohol use: Yes     Drug use: Never     Sexual activity: Not on file   Other Topics Concern     Not on file   Social History Narrative     Not on file     Social Determinants of Health     Financial Resource Strain: Low Risk  (4/14/2024)    [facility]    Financial Resource Strain      Difficulty of Paying Living Expenses: 3      Difficulty of Paying Living Expenses: Not on file   Food Insecurity: No Food Insecurity (4/14/2024)    [email or web address]    Food Insecurity      Worried About Running Out of Food in the Last Year: 1   Transportation Needs: No Transportation Needs (4/14/2024)    Received from [email or web address] [facility]    Transportation Needs      Lack of Transportation (Medical): 1   Physical Activity: Not on file   Stress: Not on file   Social Connections: Socially Integrated (4/14/2024)    [facility]    Social Connections      Frequency of Communication with Friends and Family: 0   Interpersonal  Safety: Not on file   Housing Stability: Low Risk  (4/14/2024)    Received from [employer]    Housing Stability      Unable to Pay for Housing in the Last Year: 1       Current Meds:  Current Outpatient Medications   Medication Sig Dispense Refill     acetaminophen (TYLENOL) 325 MG tablet [ACETAMINOPHEN (TYLENOL) 325 MG TABLET] Take 650 mg by mouth every 4 (four) hours as needed for pain or fever.       albuterol (PROAIR HFA/PROVENTIL HFA/VENTOLIN HFA) 108 (90 Base) MCG/ACT inhaler Inhale 1-2 puffs into the lungs as needed       atorvastatin (LIPITOR) 20 MG tablet Take 1 tablet (20 mg) by mouth daily 90 tablet 3     budesonide-formoterol (SYMBICORT) 160-4.5 MCG/ACT Inhaler Inhale 1 puff into the lungs 2 times daily       candesartan (ATACAND) 32 MG tablet [CANDESARTAN (ATACAND) 32 MG TABLET] Take 32 mg by mouth daily.       citalopram (CELEXA) 20 MG tablet Take 20 mg by mouth daily as needed       fluticasone propionate (FLONASE) 50 mcg/actuation nasal spray [FLUTICASONE PROPIONATE (FLONASE) 50 MCG/ACTUATION NASAL SPRAY] Apply 1 spray into each nostril daily as needed for rhinitis.       ibuprofen (ADVIL,MOTRIN) 200 MG tablet [IBUPROFEN (ADVIL,MOTRIN) 200 MG TABLET] Take 400 mg by mouth every 4 (four) hours as needed for pain or fever.       levothyroxine (SYNTHROID, LEVOTHROID) 150 MCG tablet [LEVOTHYROXINE (SYNTHROID, LEVOTHROID) 150 MCG TABLET] Take 150 mcg by mouth Daily at 6:00 am.        triamcinolone (KENALOG) 0.1 % cream [TRIAMCINOLONE (KENALOG) 0.1 % CREAM] Apply 1 application topically 2 (two) times a day as needed.         Physical Exam:  There were no vitals taken for this visit.  Gen: awake, alert, oriented, no distress  HEENT: nasal turbinates are unremarkable, no oropharyngeal lesions, no cervical or supraclavicular lymphadenopathy  CV: RRR, no M/G/R  Resp: CTAB, no focal crackles or wheezes  Skin: no apparent rashes  Ext: no cyanosis, clubbing or edema  Neuro: alert,  nonfocal    Labs:  Reviewed  CBC no eos in 2020  hgb 16.4      Imaging studies:  Personally reviewed    CXR from 11/25/2020  IMPRESSION:  A few faint reticulonodular opacities in the lower lungs laterally could represent either minimal airspace opacity and/or exaggeration of normal lung markings due to the markedly shallow inspiration. Lungs otherwise clear. Heart size and   pulmonary vascularity within normal limits.    Echo from [facility] March 2021  Final Conclusion Previous Study: 03/04/19    1. Technically challenging echocardiogram.    2. Normal left ventricular chamber size. Normal left ventricular systolic function. Calculated   left ventricular ejection fraction (modified    Marcelino technique) is 66 %. No regional wall motion abnormalities.  Mild concentric increase   in left ventricular wall thickness.    3. Indeterminate left ventricular diastolic function.    4. Normal right ventricular chamber size. Normal right ventricular systolic function.    5. Inferior vena cava was not well visualized.    6. No significant valvular heart disease.    7. When compared to the previous echocardiographic images of 03/04/19, there has been no   significant change.     Pulmonary Function Testing  2/21/2023  FEV 2.2L, 70%  FVC 75%  Ratio 0.72  No BD response (10% improvement in FVC)  TLC 6.15L, 85%  DLco 116% [place] for hgb  Flow volume loop is normal.     PFTs from [facility], Jan 2021:  TLC 74% predicted  FEV1 54% with +BD response  Ratio 0.72  DLco normal.       Again, thank you for allowing me to participate in the care of your patient.        Sincerely,        Gabriel Lassiter MD

## 2024-07-31 NOTE — PROGRESS NOTES
Sent new order for Luigi per Dr Lassiter as the Symbicort is not covered by insurance and PA was denied . Left vm for Mak to return call to nurse line.

## 2024-07-31 NOTE — PROGRESS NOTES
"Pulmonary Clinic Follow-up Visit    Impression: 68M who was previously healthy, with a history of COVID-19 infection x3, presents for follow up of dyspnea on exertion. As noted previously, his PFTs showed NO evidence of COPD based on the normal FEV1/FVC ratio. He does not have a history of smoking. He did have borderline bronchodilator response recently and a few years ago on PFTs from [facility]. He may have mild asthma vs. Reactive airways disease based on symptoms and PFTs.  He is doing very well on ICS/LABA and had a good response to pulmonary rehab. Lung exam and SpO2 are normal today.     Recommendations:  - continue Symbicort; will decrease to lower dose, 80-4.5, 1 puff bid with spacer. Rinse/gargle/spit after use. He will let me now if he wants to go back to the higher dose.  - continue albuterol rescue inhaler as needed  - encouraged Mak to remain active and exercise  - completed pulmonary rehab  - UTD with covid-19 (3 doses) and pneumococcal vaccination. Should get flu shot this Fall. Should also get RSV vaccine; information given.     Follow up in 1 year or sooner if needed.    The longitudinal plan of care for the diagnosis(es)/condition(s) as documented were addressed during this visit. Due to the added complexity in care, I will continue to support Mak in the subsequent management and with ongoing continuity of care.     Gabriel Lassiter MD (Avi)  [facility] Pulmonary & Critical Care ([facility])  Clinic [phone]  Fax [phone]      CCx: asthma follow up    HPI: Interim history: I last saw Mak on 7/11/2023. Since that time, he reports he's generally doing well. Rare use of rescue inhaler. Occasional \"hiccup\" sensation that makes it hard for him to take a deep breath, but he does not think this is related to his lungs.     ROS:  A 12-system review was obtained and was negative with the exception of the symptoms endorsed in the history of present illness.    PMH:  No past medical " history on file.    PSH:  No past surgical history on file.    Allergies:  Allergies   Allergen Reactions    Penicillins Anaphylaxis    Other Environmental Allergy Unknown       Family HX:  No family history on file.    Social Hx:  Social History     Socioeconomic History    Marital status:      Spouse name: Not on file    Number of children: Not on file    Years of education: Not on file    Highest education level: Not on file   Occupational History    Not on file   Tobacco Use    Smoking status: Never    Smokeless tobacco: Never   Vaping Use    Vaping status: Never Used   Substance and Sexual Activity    Alcohol use: Yes    Drug use: Never    Sexual activity: Not on file   Other Topics Concern    Not on file   Social History Narrative    Not on file     Social Determinants of Health     Financial Resource Strain: Low Risk  (4/14/2024)    Received from [employer]    Financial Resource Strain     Difficulty of Paying Living Expenses: 3     Difficulty of Paying Living Expenses: Not on file   Food Insecurity: No Food Insecurity (4/14/2024)    Received from [employer]    Food Insecurity     Worried About Running Out of Food in the Last Year: 1   Transportation Needs: No Transportation Needs (4/14/2024)    Received from [employer]    Transportation Needs     Lack of Transportation (Medical): 1   Physical Activity: Not on file   Stress: Not on file   Social Connections: Socially Integrated (4/14/2024)    Received from [employer]    Social Connections     Frequency of Communication with Friends and Family: 0   Interpersonal Safety: Not on file   Housing Stability: Low Risk  (4/14/2024)    Received from [employer]    Housing Stability     Unable to Pay for Housing in the Last Year: 1       Current Meds:  Current Outpatient Medications   Medication  Sig Dispense Refill    acetaminophen (TYLENOL) 325 MG tablet [ACETAMINOPHEN (TYLENOL) 325 MG TABLET] Take 650 mg by mouth every 4 (four) hours as needed for pain or fever.      albuterol (PROAIR HFA/PROVENTIL HFA/VENTOLIN HFA) 108 (90 Base) MCG/ACT inhaler Inhale 1-2 puffs into the lungs as needed      atorvastatin (LIPITOR) 20 MG tablet Take 1 tablet (20 mg) by mouth daily 90 tablet 3    budesonide-formoterol (SYMBICORT) 160-4.5 MCG/ACT Inhaler Inhale 1 puff into the lungs 2 times daily      candesartan (ATACAND) 32 MG tablet [CANDESARTAN (ATACAND) 32 MG TABLET] Take 32 mg by mouth daily.      citalopram (CELEXA) 20 MG tablet Take 20 mg by mouth daily as needed      fluticasone propionate (FLONASE) 50 mcg/actuation nasal spray [FLUTICASONE PROPIONATE (FLONASE) 50 MCG/ACTUATION NASAL SPRAY] Apply 1 spray into each nostril daily as needed for rhinitis.      ibuprofen (ADVIL,MOTRIN) 200 MG tablet [IBUPROFEN (ADVIL,MOTRIN) 200 MG TABLET] Take 400 mg by mouth every 4 (four) hours as needed for pain or fever.      levothyroxine (SYNTHROID, LEVOTHROID) 150 MCG tablet [LEVOTHYROXINE (SYNTHROID, LEVOTHROID) 150 MCG TABLET] Take 150 mcg by mouth Daily at 6:00 am.       triamcinolone (KENALOG) 0.1 % cream [TRIAMCINOLONE (KENALOG) 0.1 % CREAM] Apply 1 application topically 2 (two) times a day as needed.         Physical Exam:  There were no vitals taken for this visit.  Gen: awake, alert, oriented, no distress  HEENT: nasal turbinates are unremarkable, no oropharyngeal lesions, no cervical or supraclavicular lymphadenopathy  CV: RRR, no M/G/R  Resp: CTAB, no focal crackles or wheezes  Skin: no apparent rashes  Ext: no cyanosis, clubbing or edema  Neuro: alert, nonfocal    Labs:  Reviewed  CBC no eos in 2020  hgb 16.4      Imaging studies:  Personally reviewed    CXR from 11/25/2020  IMPRESSION:  A few faint reticulonodular opacities in the lower lungs laterally could represent either minimal airspace opacity and/or  exaggeration of normal lung markings due to the markedly shallow inspiration. Lungs otherwise clear. Heart size and   pulmonary vascularity within normal limits.    Echo from [facility] March 2021  Final Conclusion Previous Study: 03/04/19    1. Technically challenging echocardiogram.    2. Normal left ventricular chamber size. Normal left ventricular systolic function. Calculated   left ventricular ejection fraction (modified    Marcelino technique) is 66 %. No regional wall motion abnormalities.  Mild concentric increase   in left ventricular wall thickness.    3. Indeterminate left ventricular diastolic function.    4. Normal right ventricular chamber size. Normal right ventricular systolic function.    5. Inferior vena cava was not well visualized.    6. No significant valvular heart disease.    7. When compared to the previous echocardiographic images of 03/04/19, there has been no   significant change.     Pulmonary Function Testing  2/21/2023  FEV 2.2L, 70%  FVC 75%  Ratio 0.72  No BD response (10% improvement in FVC)  TLC 6.15L, 85%  DLco 116% [place] for hgb  Flow volume loop is normal.     PFTs from [facility], Jan 2021:  TLC 74% predicted  FEV1 54% with +BD response  Ratio 0.72  DLco normal.

## 2025-02-03 DIAGNOSIS — R93.1 ABNORMAL FINDINGS ON DIAGNOSTIC IMAGING OF HEART AND CORONARY CIRCULATION: ICD-10-CM

## 2025-02-03 DIAGNOSIS — I10 BENIGN ESSENTIAL HTN: Primary | ICD-10-CM

## 2025-02-03 DIAGNOSIS — E78.5 DYSLIPIDEMIA, GOAL LDL BELOW 70: ICD-10-CM

## 2025-02-03 RX ORDER — ATORVASTATIN CALCIUM 20 MG/1
20 TABLET, FILM COATED ORAL DAILY
Qty: 30 TABLET | Refills: 0 | Status: SHIPPED | OUTPATIENT
Start: 2025-02-03

## 2025-03-25 DIAGNOSIS — R93.1 ABNORMAL FINDINGS ON DIAGNOSTIC IMAGING OF HEART AND CORONARY CIRCULATION: ICD-10-CM

## 2025-03-25 RX ORDER — ATORVASTATIN CALCIUM 20 MG/1
20 TABLET, FILM COATED ORAL DAILY
Qty: 30 TABLET | Refills: 2 | Status: SHIPPED | OUTPATIENT
Start: 2025-03-25

## 2025-05-20 ENCOUNTER — OFFICE VISIT (OUTPATIENT)
Dept: CARDIOLOGY | Facility: CLINIC | Age: 70
End: 2025-05-20
Attending: STUDENT IN AN ORGANIZED HEALTH CARE EDUCATION/TRAINING PROGRAM
Payer: MEDICARE

## 2025-05-20 VITALS
DIASTOLIC BLOOD PRESSURE: 72 MMHG | SYSTOLIC BLOOD PRESSURE: 116 MMHG | BODY MASS INDEX: 33.37 KG/M2 | HEART RATE: 74 BPM | WEIGHT: 236 LBS | RESPIRATION RATE: 16 BRPM | OXYGEN SATURATION: 97 %

## 2025-05-20 DIAGNOSIS — I10 BENIGN ESSENTIAL HTN: ICD-10-CM

## 2025-05-20 DIAGNOSIS — R93.1 ABNORMAL FINDINGS ON DIAGNOSTIC IMAGING OF HEART AND CORONARY CIRCULATION: ICD-10-CM

## 2025-05-20 DIAGNOSIS — I48.91 ATRIAL FIBRILLATION, UNSPECIFIED TYPE (H): ICD-10-CM

## 2025-05-20 DIAGNOSIS — R07.2 PRECORDIAL PAIN: ICD-10-CM

## 2025-05-20 DIAGNOSIS — E78.5 DYSLIPIDEMIA, GOAL LDL BELOW 70: ICD-10-CM

## 2025-05-20 DIAGNOSIS — I25.10 CORONARY ARTERY DISEASE INVOLVING NATIVE CORONARY ARTERY OF NATIVE HEART WITHOUT ANGINA PECTORIS: Primary | ICD-10-CM

## 2025-05-20 PROCEDURE — 99214 OFFICE O/P EST MOD 30 MIN: CPT | Performed by: INTERNAL MEDICINE

## 2025-05-20 PROCEDURE — 3074F SYST BP LT 130 MM HG: CPT | Performed by: INTERNAL MEDICINE

## 2025-05-20 PROCEDURE — 3078F DIAST BP <80 MM HG: CPT | Performed by: INTERNAL MEDICINE

## 2025-05-20 RX ORDER — ASPIRIN 81 MG/1
81 TABLET ORAL DAILY
Status: SHIPPED
Start: 2025-05-20

## 2025-05-20 NOTE — LETTER
"5/20/2025    Joanne Winkler MD  [address] [facility] [address]    RE: Mak Pollard       Dear Colleague,     I had the pleasure of seeing Mak Pollard in the [facility].         [facility]   [address], [place] [address]   [email or web address]   OFFICE: [phone]            Impression and Plan     1.  Coronary artery disease.  Mak has coronary artery disease by virtue of CT coronary angiogram 27 December 2023 revealing moderate coronary disease involving the LAD and first diagonal.  As noted below, he does report intermittent chest discomfort though not necessarily predictable with exertion in the like.  He is, however, somewhat limited in his ambulation/exercise due to right knee discomfort.  Given the aforementioned, do feel ischemic workup is reasonable and prudent.  Given his knee discomfort he would be unable to ambulate on treadmill and therefore on treadmill perfusion imaging will be required.  Plan:  Recommend daily 81 mg aspirin.  Regadenoson stress MRI.  Mak states he has had multiple MRIs in the past and he has had no issues whatsoever with claustrophobia.    2.  Dyslipidemia.  Lipid profile 30 April 2025 revealed LDL 37 mg/dL and HDL 52 mg/dL.  Continue atorvastatin.    3.  Possible atrial fibrillation.  As noted below, Mak states he has had 2 alerts on his wrist device that he had reported \"atrial fibrillation\" though he had no subjective associated palpitations in the like.  Plan to obtain 2-week ambulatory monitor.  In addition, I instructed Mak that should his device alerted him to having atrial fibrillation that he present for ECG to further clarify.    Follow-up and further recommendations pending    35 minutes spent reviewing prior records (including documentation, laboratory studies, cardiac testing/imaging), interview with patient along with physical exam, planning, and subsequent " "documentation/crafting of note).     The longitudinal plan of care for coronary artery disease, dyslipidemia, and possible atrial fibrillation was addressed during this visit.?Due to the added complexity in care, I will continue to support Mak Pollard in the subsequent management of this condition(s) and with the ongoing continuity of care of this condition(s)\".           History of Present Illness    Once again I would like to thank you again for asking me to participate in the care of your patient, Mak Pollard.  As you know, but to reiterate for my own records, Mak Pollard is a 69 year old male with coronary artery disease by virtue of CT coronary angiogram 27 December 2023 revealing moderate coronary disease involving the LAD and first diagonal.    On interview, Mak does report some intermittent chest discomfort symptoms though not necessarily provoked with exertion.  He states he has had a slight decline in exercise tolerance but attributes this mostly to his right knee discomfort.  He also reports that he has wrist device had on a couple of occasions reported out possible atrial fibrillation.  He states he did not have any subjective palpitations when these have occurred.    Further review of systems is otherwise negative/noncontributory (medical record and 13 point review of systems reviewed as well and pertinent positives noted).         Cardiac Diagnostics      Echocardiogram 31 March 2021:   Technically difficult study.   Normal left ventricular size and systolic performance with ejection fraction of 65 to 70%.   Mild increase in left ventricular wall thickness.   No significant valvular heart disease.   Normal right ventricular size and systolic performance.   Mild to moderate left atrial enlargement.  Right term of normal dimension.     CT coronary angiogram 27 December 2023:  The 50% stenosis in the LAD coronary artery mid segment has a low likelihood of lesion specific ischemia with an " FFRct value of 0.91.  The 50% stenosis in the D1 coronary artery proximal segment has a low likelihood of lesion specific ischemia with an FFRct value of 0.89.     Holter monitor 5 March 2019:   Findings are consistent with completely benign 24-hour Holter monitor with no tachycardia or bradycardic arrhythmias or anything from an electrical aspect to explain patient's syncope.      Twelve-lead ECG (personally reviewed) 25 November 2020: Normal sinus rhythm.  Normal ECG.          Physical Examination       /72 (BP Location: Right arm, Patient Position: Sitting, Cuff Size: Adult Large)   Pulse 74   Resp 16   Wt 107 kg (236 lb)   SpO2 97%   BMI 33.37 kg/m          Wt Readings from Last 3 Encounters:   05/20/25 107 kg (236 lb)   07/31/24 109.3 kg (241 lb)   12/27/23 113.4 kg (250 lb)       The patient is alert and oriented times three. Sclerae are anicteric. Mucosal membranes are moist. Jugular venous pressure is normal. No significant adenopathy/thyromegally appreciated. Lungs are clear with good expansion. On cardiovascular exam, the patient has a regular S1 and S2. Abdomen is soft and non-tender. Extremities reveal no clubbing, cyanosis, or edema.         Family History/Social History/Risk Factors   Patient does not smoke.  Family history reviewed.         Medical History  Surgical History Family History Social History   Coronary artery disease  Dyslipidemia No past surgical history on file.       Social History     Socioeconomic History     Marital status:      Spouse name: Not on file     Number of children: Not on file     Years of education: Not on file     Highest education level: Not on file   Occupational History     Not on file   Tobacco Use     Smoking status: Never     Smokeless tobacco: Never   Vaping Use     Vaping status: Never Used   Substance and Sexual Activity     Alcohol use: Yes     Drug use: Never     Sexual activity: Not on file   Other Topics Concern     Not on file   Social  History Narrative     Not on file     Social Drivers of Health     Financial Resource Strain: Low Risk  (4/14/2024)    Received from [facility]    Financial Resource Strain      Difficulty of Paying Living Expenses: 3      Difficulty of Paying Living Expenses: Not on file   Food Insecurity: No Food Insecurity (4/14/2024)    Received from [facility]    Food Insecurity      Do you worry your food will run out before you are able to buy more?: 1   Transportation Needs: No Transportation Needs (4/14/2024)    Received from [facility]    Transportation Needs      Does lack of transportation keep you from medical appointments?: 1      Does lack of transportation keep you from work, meetings or getting things that you need?: 1   Physical Activity: Not on file   Stress: Not on file   Social Connections: Socially Integrated (4/14/2024)    Received from [facility]    Social Connections      Do you often feel lonely or isolated from those around you?: 0   Interpersonal Safety: Not on file   Housing Stability: Low Risk  (4/14/2024)    Received from [facility]    Housing Stability      What is your housing situation today?: 1           Medications  Allergies   Current Outpatient Medications   Medication Sig Dispense Refill     acetaminophen (TYLENOL) 325 MG tablet [ACETAMINOPHEN (TYLENOL) 325 MG TABLET] Take 650 mg by mouth every 4 (four) hours as needed for pain or fever.       albuterol (PROAIR HFA/PROVENTIL HFA/VENTOLIN HFA) 108 (90 Base) MCG/ACT inhaler Inhale 1-2 puffs into the lungs as needed       atorvastatin (LIPITOR) 20 MG tablet Take 1 tablet (20 mg) by mouth daily. 30 tablet 2     budesonide-formoterol (BREYNA) 80-4.5 MCG/ACT Inhaler Inhale 2 puffs into the lungs 2 times daily (Patient taking differently: Inhale 2 puffs into the lungs 2 times daily as  "needed.) 10.2 g 3     budesonide-formoterol (SYMBICORT) 80-4.5 MCG/ACT Inhaler Inhale 2 puffs into the lungs 2 times daily (Patient taking differently: Inhale 2 puffs into the lungs 2 times daily as needed.) 10.2 g 11     candesartan (ATACAND) 32 MG tablet [CANDESARTAN (ATACAND) 32 MG TABLET] Take 32 mg by mouth daily.       fluticasone propionate (FLONASE) 50 mcg/actuation nasal spray [FLUTICASONE PROPIONATE (FLONASE) 50 MCG/ACTUATION NASAL SPRAY] Apply 1 spray into each nostril daily as needed for rhinitis.       ibuprofen (ADVIL,MOTRIN) 200 MG tablet [IBUPROFEN (ADVIL,MOTRIN) 200 MG TABLET] Take 400 mg by mouth every 4 (four) hours as needed for pain or fever.       levothyroxine (SYNTHROID, LEVOTHROID) 150 MCG tablet [LEVOTHYROXINE (SYNTHROID, LEVOTHROID) 150 MCG TABLET] Take 150 mcg by mouth Daily at 6:00 am.        triamcinolone (KENALOG) 0.1 % cream [TRIAMCINOLONE (KENALOG) 0.1 % CREAM] Apply 1 application topically 2 (two) times a day as needed.       citalopram (CELEXA) 20 MG tablet Take 20 mg by mouth daily         Allergies   Allergen Reactions     Penicillins Anaphylaxis     Other Environmental Allergy Unknown          Lab Results    Chemistry/lipid CBC Cardiac Enzymes/BNP/TSH/INR   Recent Labs   Lab Test 01/03/24  0916   CHOL 141   HDL 55   LDL 66   TRIG 98     Recent Labs   Lab Test 01/03/24  0916   LDL 66     Recent Labs   Lab Test 12/27/23  0725 11/26/20  0453   NA  --  136   POTASSIUM  --  4.0   CHLORIDE  --  101   CO2  --  26   GLC  --  95   BUN  --  14   CR 1.0 0.93   GFRESTIMATED >60 >60   MARILUZ  --  7.8*     Recent Labs   Lab Test 12/27/23  0725 11/26/20  0453 11/25/20  1154   CR 1.0 0.93 1.36*     No results for input(s): \"A1C\" in the last 39640 hours.       Recent Labs   Lab Test 02/21/23  1436 11/26/20  0454   WBC  --  3.5*   HGB 16.4 13.4*   HCT  --  40.6   MCV  --  93   PLT  --  120*     Recent Labs   Lab Test 02/21/23  1436 11/26/20  0454 11/25/20  1154   HGB 16.4 13.4* 14.9    Recent Labs " "  Lab Test 11/26/20  0453 11/25/20  1154   TROPONINI 0.02 0.01     No results for input(s): \"BNP\", \"NTBNPI\", \"NTBNP\" in the last 25784 hours.  No results for input(s): \"TSH\" in the last 88867 hours.  Recent Labs   Lab Test 11/25/20  1718   INR 1.02          Medications  Allergies   Current Outpatient Medications   Medication Sig Dispense Refill     acetaminophen (TYLENOL) 325 MG tablet [ACETAMINOPHEN (TYLENOL) 325 MG TABLET] Take 650 mg by mouth every 4 (four) hours as needed for pain or fever.       albuterol (PROAIR HFA/PROVENTIL HFA/VENTOLIN HFA) 108 (90 Base) MCG/ACT inhaler Inhale 1-2 puffs into the lungs as needed       atorvastatin (LIPITOR) 20 MG tablet Take 1 tablet (20 mg) by mouth daily. 30 tablet 2     budesonide-formoterol (BREYNA) 80-4.5 MCG/ACT Inhaler Inhale 2 puffs into the lungs 2 times daily (Patient taking differently: Inhale 2 puffs into the lungs 2 times daily as needed.) 10.2 g 3     budesonide-formoterol (SYMBICORT) 80-4.5 MCG/ACT Inhaler Inhale 2 puffs into the lungs 2 times daily (Patient taking differently: Inhale 2 puffs into the lungs 2 times daily as needed.) 10.2 g 11     candesartan (ATACAND) 32 MG tablet [CANDESARTAN (ATACAND) 32 MG TABLET] Take 32 mg by mouth daily.       fluticasone propionate (FLONASE) 50 mcg/actuation nasal spray [FLUTICASONE PROPIONATE (FLONASE) 50 MCG/ACTUATION NASAL SPRAY] Apply 1 spray into each nostril daily as needed for rhinitis.       ibuprofen (ADVIL,MOTRIN) 200 MG tablet [IBUPROFEN (ADVIL,MOTRIN) 200 MG TABLET] Take 400 mg by mouth every 4 (four) hours as needed for pain or fever.       levothyroxine (SYNTHROID, LEVOTHROID) 150 MCG tablet [LEVOTHYROXINE (SYNTHROID, LEVOTHROID) 150 MCG TABLET] Take 150 mcg by mouth Daily at 6:00 am.        triamcinolone (KENALOG) 0.1 % cream [TRIAMCINOLONE (KENALOG) 0.1 % CREAM] Apply 1 application topically 2 (two) times a day as needed.       citalopram (CELEXA) 20 MG tablet Take 20 mg by mouth daily        Allergies " "  Allergen Reactions     Penicillins Anaphylaxis     Other Environmental Allergy Unknown          Lab Results   Lab Results   Component Value Date     11/26/2020    CO2 26 11/26/2020    BUN 14 11/26/2020     Lab Results   Component Value Date    WBC 3.5 11/26/2020    HGB 16.4 02/21/2023    HGB 13.4 11/26/2020    HCT 40.6 11/26/2020    MCV 93 11/26/2020     11/26/2020     Lab Results   Component Value Date    CHOL 141 01/03/2024    TRIG 98 01/03/2024    HDL 55 01/03/2024     Lab Results   Component Value Date    INR 1.02 11/25/2020     No results found for: \"BNP\"  Lab Results   Component Value Date    TROPONINI 0.02 11/26/2020    TROPONINI 0.01 11/25/2020     No results found for: \"TSH\"                 Thank you for allowing me to participate in the care of your patient.      Sincerely,     Cleopatra Freeman MD     [facility]  cc:   Rhonda Barahona PA-C  [address] [place] [address]      "

## 2025-05-20 NOTE — PROGRESS NOTES
"       [facility]   [address], [place] [address]   [email or web address]   OFFICE: [phone]            Impression and Plan     1.  Coronary artery disease.  Mak has coronary artery disease by virtue of CT coronary angiogram 27 December 2023 revealing moderate coronary disease involving the LAD and first diagonal.  As noted below, he does report intermittent chest discomfort though not necessarily predictable with exertion in the like.  He is, however, somewhat limited in his ambulation/exercise due to right knee discomfort.  Given the aforementioned, do feel ischemic workup is reasonable and prudent.  Given his knee discomfort he would be unable to ambulate on treadmill and therefore on treadmill perfusion imaging will be required.  Plan:  Recommend daily 81 mg aspirin.  Regadenoson stress MRI.  Mak states he has had multiple MRIs in the past and he has had no issues whatsoever with claustrophobia.    2.  Dyslipidemia.  Lipid profile 30 April 2025 revealed LDL 37 mg/dL and HDL 52 mg/dL.  Continue atorvastatin.    3.  Possible atrial fibrillation.  As noted below, Mak states he has had 2 alerts on his wrist device that he had reported \"atrial fibrillation\" though he had no subjective associated palpitations in the like.  Plan to obtain 2-week ambulatory monitor.  In addition, I instructed Mak that should his device alerted him to having atrial fibrillation that he present for ECG to further clarify.    Follow-up and further recommendations pending    35 minutes spent reviewing prior records (including documentation, laboratory studies, cardiac testing/imaging), interview with patient along with physical exam, planning, and subsequent documentation/crafting of note).     The longitudinal plan of care for coronary artery disease, dyslipidemia, and possible atrial fibrillation was addressed during this visit.?Due to the added complexity in care, I will " "continue to support Mak Pollard in the subsequent management of this condition(s) and with the ongoing continuity of care of this condition(s)\".           History of Present Illness    Once again I would like to thank you again for asking me to participate in the care of your patient, Mak Pollard.  As you know, but to reiterate for my own records, Mak Pollard is a 69 year old male with coronary artery disease by virtue of CT coronary angiogram 27 December 2023 revealing moderate coronary disease involving the LAD and first diagonal.    On interview, Mak does report some intermittent chest discomfort symptoms though not necessarily provoked with exertion.  He states he has had a slight decline in exercise tolerance but attributes this mostly to his right knee discomfort.  He also reports that he has wrist device had on a couple of occasions reported out possible atrial fibrillation.  He states he did not have any subjective palpitations when these have occurred.    Further review of systems is otherwise negative/noncontributory (medical record and 13 point review of systems reviewed as well and pertinent positives noted).         Cardiac Diagnostics      Echocardiogram 31 March 2021:   Technically difficult study.   Normal left ventricular size and systolic performance with ejection fraction of 65 to 70%.   Mild increase in left ventricular wall thickness.   No significant valvular heart disease.   Normal right ventricular size and systolic performance.   Mild to moderate left atrial enlargement.  Right term of normal dimension.     CT coronary angiogram 27 December 2023:  The 50% stenosis in the LAD coronary artery mid segment has a low likelihood of lesion specific ischemia with an FFRct value of 0.91.  The 50% stenosis in the D1 coronary artery proximal segment has a low likelihood of lesion specific ischemia with an FFRct value of 0.89.     Holter monitor 5 March 2019:   Findings are consistent " with completely benign 24-hour Holter monitor with no tachycardia or bradycardic arrhythmias or anything from an electrical aspect to explain patient's syncope.      Twelve-lead ECG (personally reviewed) 25 November 2020: Normal sinus rhythm.  Normal ECG.          Physical Examination       /72 (BP Location: Right arm, Patient Position: Sitting, Cuff Size: Adult Large)   Pulse 74   Resp 16   Wt 107 kg (236 lb)   SpO2 97%   BMI 33.37 kg/m          Wt Readings from Last 3 Encounters:   05/20/25 107 kg (236 lb)   07/31/24 109.3 kg (241 lb)   12/27/23 113.4 kg (250 lb)       The patient is alert and oriented times three. Sclerae are anicteric. Mucosal membranes are moist. Jugular venous pressure is normal. No significant adenopathy/thyromegally appreciated. Lungs are clear with good expansion. On cardiovascular exam, the patient has a regular S1 and S2. Abdomen is soft and non-tender. Extremities reveal no clubbing, cyanosis, or edema.         Family History/Social History/Risk Factors   Patient does not smoke.  Family history reviewed.         Medical History  Surgical History Family History Social History   Coronary artery disease  Dyslipidemia No past surgical history on file.       Social History     Socioeconomic History    Marital status:      Spouse name: Not on file    Number of children: Not on file    Years of education: Not on file    Highest education level: Not on file   Occupational History    Not on file   Tobacco Use    Smoking status: Never    Smokeless tobacco: Never   Vaping Use    Vaping status: Never Used   Substance and Sexual Activity    Alcohol use: Yes    Drug use: Never    Sexual activity: Not on file   Other Topics Concern    Not on file   Social History Narrative    Not on file     Social Drivers of Health     Financial Resource Strain: Low Risk  (4/14/2024)    Received from [facility]    Financial Resource Strain     Difficulty of Paying  Living Expenses: 3     Difficulty of Paying Living Expenses: Not on file   Food Insecurity: No Food Insecurity (4/14/2024)    [facility]    Food Insecurity     Do you worry your food will run out before you are able to buy more?: 1   Transportation Needs: No Transportation Needs (4/14/2024)    Received from [facility]    Transportation Needs     Does lack of transportation keep you from medical appointments?: 1     Does lack of transportation keep you from work, meetings or getting things that you need?: 1   Physical Activity: Not on file   Stress: Not on file   Social Connections: Socially Integrated (4/14/2024)    Received from [facility]    Social Connections     Do you often feel lonely or isolated from those around you?: 0   Interpersonal Safety: Not on file   Housing Stability: Low Risk  (4/14/2024)    Received from [facility]    Housing Stability     What is your housing situation today?: 1           Medications  Allergies   Current Outpatient Medications   Medication Sig Dispense Refill    acetaminophen (TYLENOL) 325 MG tablet [ACETAMINOPHEN (TYLENOL) 325 MG TABLET] Take 650 mg by mouth every 4 (four) hours as needed for pain or fever.      albuterol (PROAIR HFA/PROVENTIL HFA/VENTOLIN HFA) 108 (90 Base) MCG/ACT inhaler Inhale 1-2 puffs into the lungs as needed      atorvastatin (LIPITOR) 20 MG tablet Take 1 tablet (20 mg) by mouth daily. 30 tablet 2    budesonide-formoterol (BREYNA) 80-4.5 MCG/ACT Inhaler Inhale 2 puffs into the lungs 2 times daily (Patient taking differently: Inhale 2 puffs into the lungs 2 times daily as needed.) 10.2 g 3    budesonide-formoterol (SYMBICORT) 80-4.5 MCG/ACT Inhaler Inhale 2 puffs into the lungs 2 times daily (Patient taking differently: Inhale 2 puffs into the lungs 2 times daily as needed.) 10.2 g 11    candesartan (ATACAND)  "32 MG tablet [CANDESARTAN (ATACAND) 32 MG TABLET] Take 32 mg by mouth daily.      fluticasone propionate (FLONASE) 50 mcg/actuation nasal spray [FLUTICASONE PROPIONATE (FLONASE) 50 MCG/ACTUATION NASAL SPRAY] Apply 1 spray into each nostril daily as needed for rhinitis.      ibuprofen (ADVIL,MOTRIN) 200 MG tablet [IBUPROFEN (ADVIL,MOTRIN) 200 MG TABLET] Take 400 mg by mouth every 4 (four) hours as needed for pain or fever.      levothyroxine (SYNTHROID, LEVOTHROID) 150 MCG tablet [LEVOTHYROXINE (SYNTHROID, LEVOTHROID) 150 MCG TABLET] Take 150 mcg by mouth Daily at 6:00 am.       triamcinolone (KENALOG) 0.1 % cream [TRIAMCINOLONE (KENALOG) 0.1 % CREAM] Apply 1 application topically 2 (two) times a day as needed.      citalopram (CELEXA) 20 MG tablet Take 20 mg by mouth daily         Allergies   Allergen Reactions    Penicillins Anaphylaxis    Other Environmental Allergy Unknown          Lab Results    Chemistry/lipid CBC Cardiac Enzymes/BNP/TSH/INR   Recent Labs   Lab Test 01/03/24  0916   CHOL 141   HDL 55   LDL 66   TRIG 98     Recent Labs   Lab Test 01/03/24  0916   LDL 66     Recent Labs   Lab Test 12/27/23  0725 11/26/20  0453   NA  --  136   POTASSIUM  --  4.0   CHLORIDE  --  101   CO2  --  26   GLC  --  95   BUN  --  14   CR 1.0 0.93   GFRESTIMATED >60 >60   MARILUZ  --  7.8*     Recent Labs   Lab Test 12/27/23  0725 11/26/20  0453 11/25/20  1154   CR 1.0 0.93 1.36*     No results for input(s): \"A1C\" in the last 27390 hours.       Recent Labs   Lab Test 02/21/23  1436 11/26/20  0454   WBC  --  3.5*   HGB 16.4 13.4*   HCT  --  40.6   MCV  --  93   PLT  --  120*     Recent Labs   Lab Test 02/21/23  1436 11/26/20  0454 11/25/20  1154   HGB 16.4 13.4* 14.9    Recent Labs   Lab Test 11/26/20  0453 11/25/20  1154   TROPONINI 0.02 0.01     No results for input(s): \"BNP\", \"NTBNPI\", \"NTBNP\" in the last 76019 hours.  No results for input(s): \"TSH\" in the last 64442 hours.  Recent Labs   Lab Test 11/25/20  1718   INR 1.02    "       Medications  Allergies   Current Outpatient Medications   Medication Sig Dispense Refill    acetaminophen (TYLENOL) 325 MG tablet [ACETAMINOPHEN (TYLENOL) 325 MG TABLET] Take 650 mg by mouth every 4 (four) hours as needed for pain or fever.      albuterol (PROAIR HFA/PROVENTIL HFA/VENTOLIN HFA) 108 (90 Base) MCG/ACT inhaler Inhale 1-2 puffs into the lungs as needed      atorvastatin (LIPITOR) 20 MG tablet Take 1 tablet (20 mg) by mouth daily. 30 tablet 2    budesonide-formoterol (BREYNA) 80-4.5 MCG/ACT Inhaler Inhale 2 puffs into the lungs 2 times daily (Patient taking differently: Inhale 2 puffs into the lungs 2 times daily as needed.) 10.2 g 3    budesonide-formoterol (SYMBICORT) 80-4.5 MCG/ACT Inhaler Inhale 2 puffs into the lungs 2 times daily (Patient taking differently: Inhale 2 puffs into the lungs 2 times daily as needed.) 10.2 g 11    candesartan (ATACAND) 32 MG tablet [CANDESARTAN (ATACAND) 32 MG TABLET] Take 32 mg by mouth daily.      fluticasone propionate (FLONASE) 50 mcg/actuation nasal spray [FLUTICASONE PROPIONATE (FLONASE) 50 MCG/ACTUATION NASAL SPRAY] Apply 1 spray into each nostril daily as needed for rhinitis.      ibuprofen (ADVIL,MOTRIN) 200 MG tablet [IBUPROFEN (ADVIL,MOTRIN) 200 MG TABLET] Take 400 mg by mouth every 4 (four) hours as needed for pain or fever.      levothyroxine (SYNTHROID, LEVOTHROID) 150 MCG tablet [LEVOTHYROXINE (SYNTHROID, LEVOTHROID) 150 MCG TABLET] Take 150 mcg by mouth Daily at 6:00 am.       triamcinolone (KENALOG) 0.1 % cream [TRIAMCINOLONE (KENALOG) 0.1 % CREAM] Apply 1 application topically 2 (two) times a day as needed.      citalopram (CELEXA) 20 MG tablet Take 20 mg by mouth daily        Allergies   Allergen Reactions    Penicillins Anaphylaxis    Other Environmental Allergy Unknown          Lab Results   Lab Results   Component Value Date     11/26/2020    CO2 26 11/26/2020    BUN 14 11/26/2020     Lab Results   Component Value Date    WBC 3.5  "11/26/2020    HGB 16.4 02/21/2023    HGB 13.4 11/26/2020    HCT 40.6 11/26/2020    MCV 93 11/26/2020     11/26/2020     Lab Results   Component Value Date    CHOL 141 01/03/2024    TRIG 98 01/03/2024    HDL 55 01/03/2024     Lab Results   Component Value Date    INR 1.02 11/25/2020     No results found for: \"BNP\"  Lab Results   Component Value Date    TROPONINI 0.02 11/26/2020    TROPONINI 0.01 11/25/2020     No results found for: \"TSH\"               "

## 2025-05-21 ENCOUNTER — ORDERS ONLY (AUTO-RELEASED) (OUTPATIENT)
Dept: CARDIOLOGY | Facility: CLINIC | Age: 70
End: 2025-05-21
Payer: MEDICARE

## 2025-05-21 DIAGNOSIS — I48.91 ATRIAL FIBRILLATION, UNSPECIFIED TYPE (H): ICD-10-CM

## 2025-06-16 ENCOUNTER — HOSPITAL ENCOUNTER (OUTPATIENT)
Dept: MRI IMAGING | Facility: HOSPITAL | Age: 70
Discharge: HOME | End: 2025-06-16
Attending: INTERNAL MEDICINE
Payer: MEDICARE

## 2025-06-16 VITALS
OXYGEN SATURATION: 95 % | WEIGHT: 236 LBS | HEART RATE: 77 BPM | SYSTOLIC BLOOD PRESSURE: 111 MMHG | BODY MASS INDEX: 33.04 KG/M2 | DIASTOLIC BLOOD PRESSURE: 68 MMHG | HEIGHT: 71 IN

## 2025-06-16 DIAGNOSIS — E78.5 DYSLIPIDEMIA, GOAL LDL BELOW 70: ICD-10-CM

## 2025-06-16 DIAGNOSIS — I10 BENIGN ESSENTIAL HTN: ICD-10-CM

## 2025-06-16 DIAGNOSIS — R07.2 PRECORDIAL PAIN: ICD-10-CM

## 2025-06-16 DIAGNOSIS — I25.10 CORONARY ARTERY DISEASE INVOLVING NATIVE CORONARY ARTERY OF NATIVE HEART WITHOUT ANGINA PECTORIS: ICD-10-CM

## 2025-06-16 DIAGNOSIS — I95.9 HYPOTENSION: Primary | ICD-10-CM

## 2025-06-16 LAB
ATRIAL RATE - MUSE: 66 BPM
ATRIAL RATE - MUSE: 76 BPM
DIASTOLIC BLOOD PRESSURE - MUSE: NORMAL MMHG
DIASTOLIC BLOOD PRESSURE - MUSE: NORMAL MMHG
INTERPRETATION ECG - MUSE: NORMAL
INTERPRETATION ECG - MUSE: NORMAL
P AXIS - MUSE: 20 DEGREES
P AXIS - MUSE: 42 DEGREES
PR INTERVAL - MUSE: 204 MS
PR INTERVAL - MUSE: 210 MS
QRS DURATION - MUSE: 84 MS
QRS DURATION - MUSE: 92 MS
QT - MUSE: 412 MS
QT - MUSE: 422 MS
QTC - MUSE: 442 MS
QTC - MUSE: 463 MS
R AXIS - MUSE: 47 DEGREES
R AXIS - MUSE: 8 DEGREES
SYSTOLIC BLOOD PRESSURE - MUSE: NORMAL MMHG
SYSTOLIC BLOOD PRESSURE - MUSE: NORMAL MMHG
T AXIS - MUSE: 39 DEGREES
T AXIS - MUSE: 67 DEGREES
VENTRICULAR RATE- MUSE: 66 BPM
VENTRICULAR RATE- MUSE: 76 BPM

## 2025-06-16 PROCEDURE — 93016 CV STRESS TEST SUPVJ ONLY: CPT | Performed by: GENERAL ACUTE CARE HOSPITAL

## 2025-06-16 PROCEDURE — 255N000002 HC RX 255 OP 636: Performed by: INTERNAL MEDICINE

## 2025-06-16 PROCEDURE — 93010 ELECTROCARDIOGRAM REPORT: CPT | Mod: HOP | Performed by: INTERNAL MEDICINE

## 2025-06-16 PROCEDURE — 75563 CARD MRI W/STRESS IMG & DYE: CPT

## 2025-06-16 PROCEDURE — A9585 GADOBUTROL INJECTION: HCPCS | Performed by: INTERNAL MEDICINE

## 2025-06-16 PROCEDURE — 250N000011 HC RX IP 250 OP 636: Mod: JZ | Performed by: INTERNAL MEDICINE

## 2025-06-16 PROCEDURE — 93005 ELECTROCARDIOGRAM TRACING: CPT

## 2025-06-16 PROCEDURE — 75563 CARD MRI W/STRESS IMG & DYE: CPT | Mod: 26 | Performed by: INTERNAL MEDICINE

## 2025-06-16 PROCEDURE — 93018 CV STRESS TEST I&R ONLY: CPT | Performed by: INTERNAL MEDICINE

## 2025-06-16 PROCEDURE — 999N000122 MR MYOCARDIUM  OVERREAD

## 2025-06-16 RX ORDER — CAFFEINE 200 MG
200 TABLET ORAL
Status: DISCONTINUED | OUTPATIENT
Start: 2025-06-16 | End: 2025-06-16 | Stop reason: HOSPADM

## 2025-06-16 RX ORDER — AMINOPHYLLINE 25 MG/ML
50-100 INJECTION, SOLUTION INTRAVENOUS
Status: DISCONTINUED | OUTPATIENT
Start: 2025-06-16 | End: 2025-06-17 | Stop reason: HOSPADM

## 2025-06-16 RX ORDER — CAFFEINE CITRATE 20 MG/ML
60 SOLUTION INTRAVENOUS
Status: DISCONTINUED | OUTPATIENT
Start: 2025-06-16 | End: 2025-06-16 | Stop reason: HOSPADM

## 2025-06-16 RX ORDER — ALBUTEROL SULFATE 0.83 MG/ML
2.5 SOLUTION RESPIRATORY (INHALATION)
Status: DISCONTINUED | OUTPATIENT
Start: 2025-06-16 | End: 2025-06-16 | Stop reason: HOSPADM

## 2025-06-16 RX ORDER — GADOBUTROL 604.72 MG/ML
22 INJECTION INTRAVENOUS ONCE
Status: COMPLETED | OUTPATIENT
Start: 2025-06-16 | End: 2025-06-16

## 2025-06-16 RX ORDER — REGADENOSON 0.08 MG/ML
0.4 INJECTION, SOLUTION INTRAVENOUS ONCE
Status: COMPLETED | OUTPATIENT
Start: 2025-06-16 | End: 2025-06-16

## 2025-06-16 RX ADMIN — GADOBUTROL 22 ML: 604.72 INJECTION INTRAVENOUS at 09:55

## 2025-06-16 RX ADMIN — REGADENOSON 0.4 MG: 0.08 INJECTION, SOLUTION INTRAVENOUS at 09:54

## 2025-06-17 ENCOUNTER — RESULTS FOLLOW-UP (OUTPATIENT)
Dept: CARDIOLOGY | Facility: CLINIC | Age: 70
End: 2025-06-17
Payer: MEDICARE

## 2025-06-17 DIAGNOSIS — I48.91 ATRIAL FIBRILLATION, UNSPECIFIED TYPE (H): ICD-10-CM

## 2025-06-17 DIAGNOSIS — R93.1 ABNORMAL FINDINGS ON DIAGNOSTIC IMAGING OF HEART AND CORONARY CIRCULATION: ICD-10-CM

## 2025-06-17 DIAGNOSIS — R07.2 PRECORDIAL PAIN: ICD-10-CM

## 2025-06-17 DIAGNOSIS — E78.5 DYSLIPIDEMIA, GOAL LDL BELOW 70: ICD-10-CM

## 2025-06-17 DIAGNOSIS — I10 BENIGN ESSENTIAL HTN: Primary | ICD-10-CM

## 2025-06-17 DIAGNOSIS — I25.10 CORONARY ARTERY DISEASE INVOLVING NATIVE CORONARY ARTERY OF NATIVE HEART WITHOUT ANGINA PECTORIS: ICD-10-CM

## 2025-07-03 LAB — CV ZIO PRELIM RESULTS: NORMAL

## 2025-07-12 DIAGNOSIS — R93.1 ABNORMAL FINDINGS ON DIAGNOSTIC IMAGING OF HEART AND CORONARY CIRCULATION: ICD-10-CM

## 2025-07-14 RX ORDER — ATORVASTATIN CALCIUM 20 MG/1
20 TABLET, FILM COATED ORAL DAILY
Qty: 90 TABLET | Refills: 2 | Status: SHIPPED | OUTPATIENT
Start: 2025-07-14